# Patient Record
Sex: FEMALE | Race: WHITE | NOT HISPANIC OR LATINO | Employment: FULL TIME | URBAN - METROPOLITAN AREA
[De-identification: names, ages, dates, MRNs, and addresses within clinical notes are randomized per-mention and may not be internally consistent; named-entity substitution may affect disease eponyms.]

---

## 2017-07-03 ENCOUNTER — OFFICE VISIT (OUTPATIENT)
Dept: SURGERY | Facility: CLINIC | Age: 47
End: 2017-07-03

## 2017-07-03 VITALS — WEIGHT: 179.4 LBS | HEART RATE: 100 BPM | OXYGEN SATURATION: 97 % | HEIGHT: 66 IN | BODY MASS INDEX: 28.83 KG/M2

## 2017-07-03 DIAGNOSIS — K62.5 RECTAL BLEED: Primary | ICD-10-CM

## 2017-07-03 PROCEDURE — 99203 OFFICE O/P NEW LOW 30 MIN: CPT | Performed by: SURGERY

## 2017-07-03 RX ORDER — NAPROXEN SODIUM 220 MG
220 TABLET ORAL 2 TIMES DAILY PRN
COMMUNITY
End: 2022-01-03

## 2017-07-03 RX ORDER — SODIUM CHLORIDE 0.9 % (FLUSH) 0.9 %
1-10 SYRINGE (ML) INJECTION AS NEEDED
Status: CANCELLED | OUTPATIENT
Start: 2017-07-26

## 2017-07-07 NOTE — PROGRESS NOTES
SURGERY  Eveline Hayward   1970 07/07/17    Chief Complaint:  Rectal bleeding    HPI    Patient is a 47 y.o. 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  She describes these as being bright red, blood in the toilet, she has no pain at the anus or the abdomen to accompany this that would suggest a fissure or colitis.  She's been seen by Dr. Isaac who told her she had a skin tag and did not need surgery.    When she was pregnant with her second child, she developed a thrombosed hemorrhoid and believes she has had further of these since then, which has led to this situation.  She does some straining, and when she does then the area will get more dramatic and larger.  She's never had a colonoscopy area and she's tried MiraLAX in the past to become regular.  She has no family history of Crohn's disease, colon cancer, or colitis.    She does perceive that the symptoms are bad enough as far as the discomfort of the mass there that she would be willing to entertain the idea of surgery.    Past Medical History:   Diagnosis Date   • History of mammogram 02/13/2014     Past Surgical History:   Procedure Laterality Date   • BREAST BIOPSY Right 04/11/2011    ULTRASOUND-GUIDED RIGHT BREAST BIOPSY-BENIGN   • LASIK Bilateral      Family History   Problem Relation Age of Onset   • Hypertension Mother    • Uterine cancer Mother    • Thyroid disease Mother      Social History     Social History   • Marital status:      Spouse name: Hardeep   • Number of children: 2   • Years of education: N/A     Occupational History   • Ultrasound Technologist [employer]     Social History Main Topics   • Smoking status: Never Smoker   • Smokeless tobacco: Not on file   • Alcohol use Yes      Comment: 2x/month   • Drug use: Not on file   • Sexual activity: Defer     Other Topics Concern   • Not on file     Social History Narrative  "    Occupation/Additional Social Hx: Ultrasound technologist at [employer]      Current Outpatient Prescriptions:   •  naproxen sodium (ALEVE) 220 MG tablet, Take 220 mg by mouth 2 (Two) Times a Day As Needed for Mild Pain (1-3)., Disp: , Rfl:     No Known Allergies  Preventative Medicine  Colonoscopy: None    Review of Systems   Gastrointestinal: Positive for anal bleeding, blood in stool, constipation and diarrhea.       Vitals:    07/03/17 1525   Pulse: 100   SpO2: 97%   Weight: 179 lb 6.4 oz (81.4 kg)   Height: 66\" (167.6 cm)       PHYSICAL EXAM:    Pulse 100  Ht 66\" (167.6 cm)  Wt 179 lb 6.4 oz (81.4 kg)  SpO2 97%  BMI 28.96 kg/m2  Body mass index is 28.96 kg/(m^2).    Constitutional: well developed, well nourished, Appears stated age, well groomed  Eyes: sclera nonicteric, conjunctiva not injected or edematous  ENMT: Hearing intact, trachea midline, thyroid without masses  CVS: RRR, no murmur, peripheral edema not present  Respiratory: CTA, normal respiratory effort   Gastrointestinal: no hepatosplenomegaly, abdomen soft, nontender, nondistended, abdominal hernia is not detected, incisional scars not detected, kelly of protruding internal hemorrhoids that are small, with a combination of external as well  Genitourinary: inguinal hernia not detected  Musculoskeletal: gait normal, muscle mass normal  Skin: warm and dry, no rashes visible  Neurological: awake and alert, seems to have reasonable capacity for understanding for medical decision making  Psychiatric: appears to have reasonable judgement, pleasant  Lymphatics: no cervical adenopathy     Radiographic Findings: None    Lab Findings: None    Pamphlet reviewed: Transanal hemorrhoidal dearterialization    IMPRESSION:  · Rectal bleeding, associated with a kelly of redundant tissue externally at the anus including protrusion of small internal hemorrhoids.  There is no discrete lesion here that appears to be able to be " addressed    PLAN:  · Transanal hemorrhoidal dearterialization versus a colonoscopy with internal hemorrhoidal banding.  I discussed with her the 2 options, the greater attention to the detail of external redundant tissue via the transanal hemorrhoidal dearterialization, but with the need for general anesthetic, and certainly greater risk for postoperative pain.  With the colonoscopy, we can ascertain if there is a source for the bleeding that is worrisome, and band the internal hemorrhoids which may help to a minor extent with the protrusion of tissue, and we'll definitely result in less pain postoperatively.  She has chosen the latter and we will proceed with that.    Frida Barillas MD

## 2017-07-26 ENCOUNTER — HOSPITAL ENCOUNTER (OUTPATIENT)
Facility: HOSPITAL | Age: 47
Setting detail: HOSPITAL OUTPATIENT SURGERY
Discharge: HOME | End: 2017-07-26
Attending: SURGERY | Admitting: SURGERY

## 2017-07-26 ENCOUNTER — ANESTHESIA (OUTPATIENT)
Dept: GASTROENTEROLOGY | Facility: HOSPITAL | Age: 47
End: 2017-07-26

## 2017-07-26 ENCOUNTER — ANESTHESIA EVENT (OUTPATIENT)
Dept: GASTROENTEROLOGY | Facility: HOSPITAL | Age: 47
End: 2017-07-26

## 2017-07-26 VITALS
TEMPERATURE: 98.7 F | DIASTOLIC BLOOD PRESSURE: 45 MMHG | HEIGHT: 66 IN | OXYGEN SATURATION: 99 % | WEIGHT: 173.19 LBS | SYSTOLIC BLOOD PRESSURE: 111 MMHG | RESPIRATION RATE: 18 BRPM | BODY MASS INDEX: 27.83 KG/M2 | HEART RATE: 71 BPM

## 2017-07-26 DIAGNOSIS — K62.5 RECTAL BLEED: ICD-10-CM

## 2017-07-26 LAB
B-HCG UR QL: NEGATIVE
INTERNAL NEGATIVE CONTROL: NEGATIVE
INTERNAL POSITIVE CONTROL: POSITIVE
Lab: NORMAL

## 2017-07-26 PROCEDURE — 25010000002 PROPOFOL 10 MG/ML EMULSION: Performed by: NURSE ANESTHETIST, CERTIFIED REGISTERED

## 2017-07-26 PROCEDURE — 25010000002 GLUCAGON (RDNA) PER 1 MG: Performed by: SURGERY

## 2017-07-26 PROCEDURE — 45378 DIAGNOSTIC COLONOSCOPY: CPT | Performed by: SURGERY

## 2017-07-26 RX ORDER — SODIUM CHLORIDE 0.9 % (FLUSH) 0.9 %
1-10 SYRINGE (ML) INJECTION AS NEEDED
Status: DISCONTINUED | OUTPATIENT
Start: 2017-07-26 | End: 2017-07-26 | Stop reason: HOSPADM

## 2017-07-26 RX ORDER — PROMETHAZINE HYDROCHLORIDE 25 MG/1
25 SUPPOSITORY RECTAL ONCE AS NEEDED
Status: DISCONTINUED | OUTPATIENT
Start: 2017-07-26 | End: 2017-07-26 | Stop reason: HOSPADM

## 2017-07-26 RX ORDER — PROMETHAZINE HYDROCHLORIDE 25 MG/1
25 TABLET ORAL ONCE AS NEEDED
Status: DISCONTINUED | OUTPATIENT
Start: 2017-07-26 | End: 2017-07-26 | Stop reason: HOSPADM

## 2017-07-26 RX ORDER — PROMETHAZINE HYDROCHLORIDE 25 MG/ML
12.5 INJECTION, SOLUTION INTRAMUSCULAR; INTRAVENOUS ONCE AS NEEDED
Status: DISCONTINUED | OUTPATIENT
Start: 2017-07-26 | End: 2017-07-26 | Stop reason: HOSPADM

## 2017-07-26 RX ORDER — PROPOFOL 10 MG/ML
VIAL (ML) INTRAVENOUS AS NEEDED
Status: DISCONTINUED | OUTPATIENT
Start: 2017-07-26 | End: 2017-07-26 | Stop reason: SURG

## 2017-07-26 RX ORDER — PROPOFOL 10 MG/ML
VIAL (ML) INTRAVENOUS CONTINUOUS PRN
Status: DISCONTINUED | OUTPATIENT
Start: 2017-07-26 | End: 2017-07-26 | Stop reason: SURG

## 2017-07-26 RX ORDER — SODIUM CHLORIDE, SODIUM LACTATE, POTASSIUM CHLORIDE, CALCIUM CHLORIDE 600; 310; 30; 20 MG/100ML; MG/100ML; MG/100ML; MG/100ML
30 INJECTION, SOLUTION INTRAVENOUS CONTINUOUS PRN
Status: DISCONTINUED | OUTPATIENT
Start: 2017-07-26 | End: 2017-07-26 | Stop reason: HOSPADM

## 2017-07-26 RX ADMIN — PROPOFOL 200 MCG/KG/MIN: 10 INJECTION, EMULSION INTRAVENOUS at 07:54

## 2017-07-26 RX ADMIN — SODIUM CHLORIDE, POTASSIUM CHLORIDE, SODIUM LACTATE AND CALCIUM CHLORIDE 30 ML/HR: 600; 310; 30; 20 INJECTION, SOLUTION INTRAVENOUS at 07:03

## 2017-07-26 RX ADMIN — PROPOFOL 150 MG: 10 INJECTION, EMULSION INTRAVENOUS at 07:54

## 2017-07-26 NOTE — INTERVAL H&P NOTE
H&P updated. The patient was examined and the following changes are noted:  hemorrhoids are less problematic but still with some discomfort.  will band if appropriate

## 2017-07-26 NOTE — ANESTHESIA PREPROCEDURE EVALUATION
Anesthesia Evaluation     Patient summary reviewed and Nursing notes reviewed   NPO Solid Status: > 8 hours  NPO Liquid Status: > 4 hours     Airway   Mallampati: I  TM distance: >3 FB  Neck ROM: full  no difficulty expected  Dental - normal exam     Pulmonary - negative pulmonary ROS and normal exam   Cardiovascular - negative cardio ROS and normal exam        Neuro/Psych- negative ROS  GI/Hepatic/Renal/Endo - negative ROS     Musculoskeletal (-) negative ROS    Abdominal  - normal exam    Bowel sounds: normal.   Substance History - negative use     OB/GYN negative ob/gyn ROS         Other                                        Anesthesia Plan    ASA 1     MAC     Anesthetic plan and risks discussed with patient.

## 2017-07-26 NOTE — H&P (VIEW-ONLY)
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  She describes these as being bright red, blood in the toilet, she has no pain at the anus or the abdomen to accompany this that would suggest a fissure or colitis.  She's been seen by Dr. Isaac who told her she had a skin tag and did not need surgery.    When she was pregnant with her second child, she developed a thrombosed hemorrhoid and believes she has had further of these since then, which has led to this situation.  She does some straining, and when she does then the area will get more dramatic and larger.  She's never had a colonoscopy area and she's tried MiraLAX in the past to become regular.  She has no family history of Crohn's disease, colon cancer, or colitis.    She does perceive that the symptoms are bad enough as far as the discomfort of the mass there that she would be willing to entertain the idea of surgery.    Past Medical History:   Diagnosis Date   • History of mammogram 02/13/2014     Past Surgical History:   Procedure Laterality Date   • BREAST BIOPSY Right 04/11/2011    ULTRASOUND-GUIDED RIGHT BREAST BIOPSY-BENIGN   • LASIK Bilateral      Family History   Problem Relation Age of Onset   • Hypertension Mother    • Uterine cancer Mother    • Thyroid disease Mother      Social History     Social History   • Marital status:      Spouse name: Hardeep   • Number of children: 2   • Years of education: N/A     Occupational History   • Ultrasound Technologist [employer]     Social History Main Topics   • Smoking status: Never Smoker   • Smokeless tobacco: Not on file   • Alcohol use Yes      Comment: 2x/month   • Drug use: Not on file   • Sexual activity: Defer     Other Topics Concern   • Not on file     Social History Narrative  "    Occupation/Additional Social Hx: Ultrasound technologist at [employer]      Current Outpatient Prescriptions:   •  naproxen sodium (ALEVE) 220 MG tablet, Take 220 mg by mouth 2 (Two) Times a Day As Needed for Mild Pain (1-3)., Disp: , Rfl:     No Known Allergies  Preventative Medicine  Colonoscopy: None    Review of Systems   Gastrointestinal: Positive for anal bleeding, blood in stool, constipation and diarrhea.       Vitals:    07/03/17 1525   Pulse: 100   SpO2: 97%   Weight: 179 lb 6.4 oz (81.4 kg)   Height: 66\" (167.6 cm)       PHYSICAL EXAM:    Pulse 100  Ht 66\" (167.6 cm)  Wt 179 lb 6.4 oz (81.4 kg)  SpO2 97%  BMI 28.96 kg/m2  Body mass index is 28.96 kg/(m^2).    Constitutional: well developed, well nourished, Appears stated age, well groomed  Eyes: sclera nonicteric, conjunctiva not injected or edematous  ENMT: Hearing intact, trachea midline, thyroid without masses  CVS: RRR, no murmur, peripheral edema not present  Respiratory: CTA, normal respiratory effort   Gastrointestinal: no hepatosplenomegaly, abdomen soft, nontender, nondistended, abdominal hernia is not detected, incisional scars not detected, kelly of protruding internal hemorrhoids that are small, with a combination of external as well  Genitourinary: inguinal hernia not detected  Musculoskeletal: gait normal, muscle mass normal  Skin: warm and dry, no rashes visible  Neurological: awake and alert, seems to have reasonable capacity for understanding for medical decision making  Psychiatric: appears to have reasonable judgement, pleasant  Lymphatics: no cervical adenopathy     Radiographic Findings: None    Lab Findings: None    Pamphlet reviewed: Transanal hemorrhoidal dearterialization    IMPRESSION:  · Rectal bleeding, associated with a kelly of redundant tissue externally at the anus including protrusion of small internal hemorrhoids.  There is no discrete lesion here that appears to be able to be " addressed    PLAN:  · Transanal hemorrhoidal dearterialization versus a colonoscopy with internal hemorrhoidal banding.  I discussed with her the 2 options, the greater attention to the detail of external redundant tissue via the transanal hemorrhoidal dearterialization, but with the need for general anesthetic, and certainly greater risk for postoperative pain.  With the colonoscopy, we can ascertain if there is a source for the bleeding that is worrisome, and band the internal hemorrhoids which may help to a minor extent with the protrusion of tissue, and we'll definitely result in less pain postoperatively.  She has chosen the latter and we will proceed with that.    Frida Barillas MD

## 2017-07-26 NOTE — ANESTHESIA POSTPROCEDURE EVALUATION
"Patient: Eveline Hayward    Procedure Summary     Date Anesthesia Start Anesthesia Stop Room / Location    07/26/17 0743 0815  [facility]       Procedure Diagnosis Surgeon Provider    COLONOSCOPY into cecum/terminal ileum (N/A ) Rectal bleed  (Rectal bleed [K62.5]) MD Tamera Del Valle MD          Anesthesia Type: MAC  Last vitals  BP   111/45 (07/26/17 0833)    Temp        Pulse   71 (07/26/17 0833)   Resp   18 (07/26/17 0833)    SpO2   99 % (07/26/17 0833)      Post Anesthesia Care and Evaluation      Comments: Blood pressure 111/45, pulse 71, temperature 37.1 °C (98.7 °F), temperature source Oral, resp. rate 18, height 66\" (167.6 cm), weight 173 lb 3 oz (78.6 kg), last menstrual period 07/05/2017, SpO2 99 %.          "

## 2017-07-26 NOTE — OP NOTE
Colonoscopy Procedure Note  Eveline Hayward  1970  Date of Procedure: 07/26/17    Pre-operative Diagnosis:    · Rectal bleeding    Post-operative Diagnosis:  · Large external hemorrhoids  · Single diverticulum, descending colon    Procedure: Colonoscopy with terminal ileoscopy     Findings/Treatments:   · Large external hemorrhoids, thus not amenable to banding  · Single diverticulum, descending colon       Recommendations:   · C scope in 10 years  · Diverticulosis information to be given 7/26/17  · Call to schedule transanal hemorrhoidal dearterialization with proctopexy  · Give patient copy of procedural photographs    Surgeon: Eran    Anesthetic: MAC per Tamera Oconnor MD    Indications:  As above    Scope Withdrawal Time:  6 minutes  36 seconds    Procedure Details     MAC anesthesia was induced.  The 180 Colonoscopy was inserted blindly into the rectum and advanced to the cecum, with relative ease, but with some need for pressure, with redundancy.  Cecum was identified by ileocecal valve and appendiceal orifice and photographed for documentation.  Terminal ileum was intubated and was normal.    Prep quality was excellent.  A careful inspection was made as the colonoscope was withdrawn, including a retroflexed view of the rectum; there was no suggestion of presence of angiodysplasias, colitis, or polyps but there was a single diverticula.    I carefully visualized the hemorrhoids both from retroflexed (twice, at the beginning and end) and anteflexed, as well as pulling the tissue out thru the anus.  The hemorrhoids were sizeable and unable to be clearly identified as above the dentate line and thus not felt amenable to banding.    Frida Barillas MD  8:11 AM

## 2017-07-26 NOTE — PLAN OF CARE
Problem: Patient Care Overview (Adult)  Goal: Plan of Care Review  Outcome: Ongoing (interventions implemented as appropriate)    07/26/17 0626   Coping/Psychosocial Response Interventions   Plan Of Care Reviewed With patient   Patient Care Overview   Progress no change       Goal: Adult Individualization and Mutuality  Outcome: Ongoing (interventions implemented as appropriate)  Goal: Discharge Needs Assessment  Outcome: Ongoing (interventions implemented as appropriate)    07/26/17 0626   Discharge Needs Assessment   Concerns To Be Addressed basic needs concerns   Equipment Needed After Discharge none   Discharge Disposition home or self-care   Self-Care   Equipment Currently Used at Home none   Living Environment   Transportation Available car         Problem: GI Endoscopy (Adult)  Goal: Signs and Symptoms of Listed Potential Problems Will be Absent or Manageable (GI Endoscopy)  Outcome: Ongoing (interventions implemented as appropriate)    07/26/17 0626   GI Endoscopy   Problems Assessed (GI Endoscopy) pain   Problems Present (GI Endoscopy) none

## 2017-08-17 ENCOUNTER — OFFICE VISIT (OUTPATIENT)
Dept: INTERNAL MEDICINE | Facility: CLINIC | Age: 47
End: 2017-08-17

## 2017-08-17 VITALS
DIASTOLIC BLOOD PRESSURE: 80 MMHG | HEIGHT: 66 IN | TEMPERATURE: 98.6 F | BODY MASS INDEX: 29.25 KG/M2 | OXYGEN SATURATION: 98 % | WEIGHT: 182 LBS | RESPIRATION RATE: 16 BRPM | SYSTOLIC BLOOD PRESSURE: 120 MMHG | HEART RATE: 74 BPM

## 2017-08-17 DIAGNOSIS — S39.012A LUMBAR STRAIN, INITIAL ENCOUNTER: ICD-10-CM

## 2017-08-17 DIAGNOSIS — M54.32 SCIATICA OF LEFT SIDE: ICD-10-CM

## 2017-08-17 DIAGNOSIS — S16.1XXA NECK STRAIN, INITIAL ENCOUNTER: Primary | ICD-10-CM

## 2017-08-17 DIAGNOSIS — S46.912A SHOULDER STRAIN, LEFT, INITIAL ENCOUNTER: ICD-10-CM

## 2017-08-17 PROCEDURE — 99213 OFFICE O/P EST LOW 20 MIN: CPT | Performed by: NURSE PRACTITIONER

## 2017-08-17 RX ORDER — CYCLOBENZAPRINE HCL 10 MG
10 TABLET ORAL 3 TIMES DAILY PRN
Qty: 45 TABLET | Refills: 0 | Status: SHIPPED | OUTPATIENT
Start: 2017-08-17 | End: 2022-01-03

## 2017-08-17 NOTE — PROGRESS NOTES
Vitals:    08/17/17 1437   BP: 120/80   Pulse: 74   Resp: 16   Temp: 98.6 °F (37 °C)   SpO2: 98%     Last 2 weights    08/17/17  1437   Weight: 182 lb (82.6 kg)     Social History   Substance Use Topics   • Smoking status: Never Smoker   • Smokeless tobacco: Not on file   • Alcohol use Yes      Comment: 2x/month       Subjective     HPI  Pt presents to office today for lumbar back pain and sciatica, neck and right shoulder pain. 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 She states that she has been having left sided pain buttock pain that radiates down her left leg. This has been occurring on and off for the past 2 months. She also has been having lumbar back spasms for the past 6-8 months. She has had 3 flair up within this period. Also, pt has been experiencing right sided neck and right sided shoulder pain. She is right hand dominent and uses that side to work. She recognized this is likely work related and cant recall a specific injury that could have cause any issues she presents with today. She has tried heat with helps a lot and has been taking 2 aleve. Pt denies limited ROM, unbearable pain, numbness, tingling, loss of sensation of extremities. Her sister is a PTA and has been going stretches and massage which help some but pt is still having sciatica and lumbar problem.      The following portions of the patient's history were reviewed and updated as appropriate: allergies, current medications, past medical history, past social history and problem list.    Review of Systems   Constitutional: Negative.    Respiratory: Negative.    Cardiovascular: Negative.    Musculoskeletal: Positive for back pain.        Shoulder pain, neck pain       Objective     Physical Exam   Constitutional: She is oriented to person, place, and time. Vital signs are normal. She appears well-developed and well-nourished.   HENT:   Head: Normocephalic and atraumatic.   Neck: Normal range of motion.    Cardiovascular: Normal rate, regular rhythm and normal heart sounds.    Pulmonary/Chest: Effort normal and breath sounds normal.   Musculoskeletal: Normal range of motion.        Lumbar back: She exhibits spasm.   Right sided neck and should tenderness with ROM exercises. Able to perform ROM exercises.   Neurological: She is oriented to person, place, and time.   Nursing note and vitals reviewed.      Assessment/Plan   Eveline was seen today for back pain and neck pain.    Diagnoses and all orders for this visit:    Neck strain, initial encounter    Shoulder strain, left, initial encounter    Lumbar strain, initial encounter    Sciatica of left side    Other orders  -     cyclobenzaprine (FLEXERIL) 10 MG tablet; Take 1 tablet by mouth 3 (Three) Times a Day As Needed for Muscle Spasms.          -all issues discussed today likely related to work and repetitive movement/positions  -cont aleve  -start flexeril for muscle spasms; massage therapy; sciatica stretches, and heat therapy  -imaging not necessary at this time.  -mentioned poss referral to PT for shoulder/back/sciatica  -discussed therapy if sciatica continues-epidural injection or possible neurontin  -cont home meds  -FU in 4 weeks or soon if pain worsen

## 2018-08-13 ENCOUNTER — HOSPITAL ENCOUNTER (OUTPATIENT)
Dept: MAMMOGRAPHY | Facility: HOSPITAL | Age: 48
Discharge: HOME | End: 2018-08-13
Admitting: INTERNAL MEDICINE

## 2018-08-13 DIAGNOSIS — Z12.31 VISIT FOR SCREENING MAMMOGRAM: ICD-10-CM

## 2018-08-13 PROCEDURE — 77067 SCR MAMMO BI INCL CAD: CPT

## 2018-08-13 PROCEDURE — 77063 BREAST TOMOSYNTHESIS BI: CPT

## 2021-01-04 ENCOUNTER — IMMUNIZATION (OUTPATIENT)
Dept: VACCINE CLINIC | Facility: HOSPITAL | Age: 51
End: 2021-01-04

## 2021-01-04 PROCEDURE — 0001A: CPT | Performed by: INTERNAL MEDICINE

## 2021-01-04 PROCEDURE — 91300 HC SARSCOV02 VAC 30MCG/0.3ML IM: CPT | Performed by: INTERNAL MEDICINE

## 2021-01-26 ENCOUNTER — IMMUNIZATION (OUTPATIENT)
Dept: VACCINE CLINIC | Facility: HOSPITAL | Age: 51
End: 2021-01-26

## 2021-01-26 PROCEDURE — 91300 HC SARSCOV02 VAC 30MCG/0.3ML IM: CPT | Performed by: INTERNAL MEDICINE

## 2021-01-26 PROCEDURE — 0002A: CPT | Performed by: INTERNAL MEDICINE

## 2021-06-09 ENCOUNTER — HOSPITAL ENCOUNTER (OUTPATIENT)
Dept: MAMMOGRAPHY | Facility: HOSPITAL | Age: 51
Discharge: HOME | End: 2021-06-09
Admitting: OBSTETRICS & GYNECOLOGY

## 2021-06-09 DIAGNOSIS — Z12.31 VISIT FOR SCREENING MAMMOGRAM: ICD-10-CM

## 2021-06-09 PROCEDURE — 77067 SCR MAMMO BI INCL CAD: CPT

## 2021-06-09 PROCEDURE — 77063 BREAST TOMOSYNTHESIS BI: CPT

## 2022-01-01 ENCOUNTER — E-VISIT (OUTPATIENT)
Dept: FAMILY MEDICINE CLINIC | Facility: TELEHEALTH | Age: 52
End: 2022-01-01

## 2022-01-01 NOTE — E-VISIT ESCALATED
Patient escalated   Provider Lilibeth Dennison chose to escalate patient to another level of care because: Insufficient information to diagnose   Patient was sent the following message:   Based on the information you've provided us, you need to take another step to get care. Have additional questions for patient   What to do now:    Please set up a video visit  .   You won't be charged for your eVisit. If you paid with a credit card, the charge will be reversed.   Chief Complaint: Coronavirus (COVID-19), cold, sinus pain, allergy, or flu   Patient introduction   Patient is 51-year-old female who reports fever (which may have resolved; see below), rhinitis, sore throat, headache, sweats, and chills that started < 48 hours ago.   Patient has not requested COVID testing.   Coronavirus Disease 2019 (COVID-19) exposure, testing history, and vaccination status:    Close contact with a person with a confirmed case of COVID-19.    Exposure was more than 7 days ago.    No recent travel outside of their local community.    Patient had a viral test within the last 7 days. Test result was positive.    Reports receiving 2 doses.    Received the Pfizer vaccine for the first dose.    Received the Pfizer vaccine for the second dose.    Received their most recent dose of the vaccine > 14 days ago.   Warning. The following may warrant further investigation:    Reports confirmed exposure to COVID-19   When asked why they're seeking care online today, patient reports they want a specific treatment or medication and want to know if they need to be seen by a provider. 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   Patient did not request an excuse note.   General presentation   Symptoms came on gradually.   Sinus and nasal symptoms:    Reports rhinitis.    Reports clear nasal drainage.    Reports postnasal drip.     Denies nasal or sinus congestion.    Denies itchy nose or sneezing.   Sore throat:    Reports sore throat.    Denies recent strep exposure.    Patient does not think they have strep.    Patient is able to swallow liquids and solid foods with ease.   Head and body aches:    Reports headache, described as moderate (4-6 on a scale of 1-10).    Reports sweats.    Reports chills.    Denies myalgia.    Denies fatigue.   Dizziness:    Denies dizziness.   Cough:    Denies cough.   Wheezing and SOB:    Denies COPD diagnosis.    Denies asthma diagnosis.    Denies wheezing.    Denies shortness of breath.    Denies previous albuterol inhaler use during URIs, bronchitis, or pneumonia.    Denies previous steroid inhaler use during URIs, bronchitis, or pneumonia.   Chest pain:    Denies chest pain.   Allergies:    Denies history of allergies.   Flu exposure:    Denies recent exposure to confirmed flu diagnosis.    Reports a flu vaccine in the last 1-3 months.   Patient denies the following red flags:    Changes in alertness or awareness    Symptoms suggesting airway obstruction    Symptoms suggesting intracranial hemorrhage    Decreased urination   Pregnancy/menstrual status/breastfeeding:    Denies being pregnant    Denies breastfeeding    Regarding last menstrual period, patient writes: December 15th   Self-exam:    No difficulty moving their chin toward their chest    Neck lymph nodes feel normal   Denies antibiotic treatment for similar symptoms within the past month.   Current medications   Reports taking over-the-counter medication for current symptoms. Patient has taken ibuprofen.   Reports taking zinc (as zinc acetate), PANTHENOL/VITAMIN A/VITAMIN D/VITAMIN E, AMINO ACID/IRON/MULTIVITAMINS, estradiol / progesterone, and ibuprofen 200 MG [Anadin Ibuprofen].   Medication allergies   None.   Medication contraindication review   Denies history of anaphylactic reaction to beta-lactam antibiotics; aspirin triad; blood  dyscrasia; bone marrow depression; catecholamine-releasing paraganglioma; coronary artery disease; coagulation disorder; congenital long QT syndrome; depression; electrolyte abnormalities; fungal infection; GI bleeding; GI obstruction; G6PD deficiency; heart arrhythmia; hypertension; kidney disease or hemodialysis; mononucleosis; myasthenia; recent myocardial infarction; NSAID-induced asthma/urticaria; Parkinson's disease; pheochromocytoma; porphyria; Reye syndrome; seizure disorder; ulcerative colitis; and urinary retention.   Denies history of metoclopramide-associated dystonic reaction and tardive dyskinesia.   No known history of amoxicillin-clavulanate-associated cholestatic jaundice or hepatic impairment.   No known history of azithromycin-associated cholestatic jaundice or hepatic impairment.   Past medical history   Immune conditions: Denies immunocompromising conditions. Denies history of cancer.   Social history   Reports being a healthcare worker.   Non-smoker.   Assessment:   Patient determined to need a level of care not appropriate to be delivered through eVisit.   Plan:   Patient informed of need to seek in-person care      ----------   Electronically signed by ISSAC Green on 2022-01-01 at 00:32AM   ----------   Patient Interview Transcript:   Why are you getting care through eVisit today? We can't guarantee a specific treatment or test. Your provider will decide what's best for you. Select all that apply.    I want a specific treatment or medication    I want to know if I need to be seen by a provider   Not selected:    I want to know if I have a cold or something more serious    I need a doctor's note    I want to be tested for COVID-19    I want to get the COVID-19 vaccine    I think I'm having side effects from the COVID-19 vaccine    I just want to feel better!    None of the above   Tell us which specific treatment or medication you'd like. Your provider will make the final decision on  which treatment is best for your condition.    Covid treatment   Which of these symptoms are bothering you? Select all that apply.    Fever    Runny nose    Sore throat    Headache    Sweats    Chills   Not selected:    Cough    Shortness of breath    Stuffed-up nose or sinuses    Itchy or watery eyes    Itchy nose or sneezing    Loss of smell or taste    Hoarse voice or loss of voice    Muscle or body aches    Fatigue or tiredness    Nausea or vomiting    Diarrhea    I don't have any of these symptoms   Before we learn more about why you're here, we'll get some information related to COVID-19. We'll ask about risk factors, testing, vaccination status, and exposure. Do you have any of these conditions? If so, you may be at increased risk for complications from COVID-19. Select all that apply.    None of the above   Not selected:    Chronic lung disease, such as cystic fibrosis or interstitial fibrosis    Heart disease, such as congenital heart disease, congestive heart failure, or coronary artery disease    Disorder of the brain, spinal cord, or nerves and muscles, such as dementia, cerebral palsy, epilepsy, muscular dystrophy, or developmental delay    Metabolic disorder or mitochondrial disease    Cerebrovascular disease, such as stroke or another condition affecting the blood vessels or blood supply to the brain   Do you live in a group care setting? Examples include: - Nursing home - Residential care - Psychiatric treatment facility - Group home - Dormitory - Board and care home - Homeless shelter - Foster care setting Select one.    No   Not selected:    Yes   Have you ever been tested for COVID-19? Select one.    Yes   Not selected:    No   When was your most recent COVID-19 test? Select one.    Within the last week   Not selected:    7 to 14 days ago    15 to 30 days ago    1 to 3 months ago    More than 3 months ago   What type of COVID-19 test did you have? There are 2 types of COVID-19 tests: - Viral tests  "check if you're currently infected with COVID-19. - Antibody tests check if you've been infected in the past. Select one.    Viral test for current infection   Not selected:    Antibody test for past infection   What was the result of your most recent COVID-19 test? Select one.    Positive (signs of current or past infection)   Not selected:    Negative (no sign of infection)    I'm not sure   Have you gotten the COVID-19 vaccine? Select one.    Yes   Not selected:    No   How many doses of the COVID-19 vaccine have you gotten? This includes boosters. Select one.    2 doses   Not selected:    1 dose    3 doses   Which COVID-19 vaccine did you get for your first dose? Check your Vaccination Record Card under Product Name/. Select one.    Pfizer-BioNTech (Pfizer)   Not selected:    Shashank & Shashank's Robb Vaccine (J&J/Robb)    Moderna   Which COVID-19 vaccine did you get for your second dose? Check your Vaccination Record Card under Product Name/. Select one.    Pfizer-BioNTech (Pfizer)   Not selected:    Shashank & Shashank's Robb Vaccine (J&J/Robb)    Moderna   When did you get your most recent dose of the COVID-19 vaccine?    More than 14 days ago   Not selected:    Less than 48 hours (2 days) ago    48 to 72 hours (3 days) ago    3 to 5 days ago    5 to 7 days ago    7 to 14 days ago   In the last 14 days, have you traveled outside of your local community? This includes travel by car, RV, bus, train, or plane. Travel increases your chances of getting and spreading COVID-19. Select one.    No   Not selected:    Yes   In the last 14 days, have you had close contact with someone who has coronavirus (COVID-19)? \"Close contact\" means any of these: - Living in the same household as someone with COVID-19. - Caring for someone with COVID-19. - Being within 6 feet of someone with COVID-19 for a total of at least 15 minutes over a 24-hour period. For example, three 5-minute exposures for a " "total of 15 minutes. - Being in direct contact with respiratory droplets from someone with COVID-19 (being coughed on, kissing, sharing utensils). Select one.    Yes, a confirmed case   Not selected:    Yes, a suspected case    No, not that I know of   When did the close contact happen? Select one.    More than 7 days ago   Not selected:    Within the last 2 days    3 to 7 days ago   Thanks for completing our COVID-19 questions. Now we'll return to your symptoms. When did your symptoms start? If you know the exact date your symptoms started, choose Other and enter the month and day. Select one.    Less than 48 hours ago   Not selected:    3 to 5 days ago    6 to 9 days ago    10 to 14 days ago    2 to 4 weeks ago    More than a month ago    Other (specify)   Did your symptoms come on suddenly or gradually? Select one.    Gradually   Not selected:    Suddenly    I'm not sure   You mentioned having a fever. Do you have a fever now? Select one.    I'm not sure   Not selected:    Yes, and I've had one since my symptoms started    Yes, but I didn't have one when my symptoms started    No, it's gone now   You mentioned having a headache. On a scale of 1 to 10, how severe is your headache pain? Select one.    Moderate (4 to 6)   Not selected:    Mild (1 to 3)    Severe (7 to 9)    Unbearable (10)    The worst headache of my life (10+)   What color is your nasal drainage? Select one.    Clear   Not selected:    White    Yellow    Green    My nose is stuffed but not draining or running    I'm not sure   Is your nasal drainage thick or thin? Select one.    I'm not sure   Not selected:    Thick    Thin   Is there any drainage (mucus) going down the back of your throat? This kind of drainage is also called \"postnasal drip.\" Select one.    Yes   Not selected:    No    I'm not sure   Can you swallow liquids and solid foods? A sore throat may be painful when swallowing, but it shouldn't prevent you from swallowing. Select one.   "  Yes, with ease   Not selected:    Yes, but it's uncomfortable    Yes, but it's painful    It's hard to swallow anything because it feels like liquids and food get stuck in my throat    No, I can't swallow anything, liquid or solid foods   Since your symptoms started, have you felt dizzy? Select one.    No   Not selected:    Yes, but I can continue with my regular daily activities    Yes, and it makes it hard to stand, walk, or do daily activities   Do you have chest pain? You might also feel it as discomfort, aching, tightness, or squeezing in the chest. Select one.    No   Not selected:    Yes   Have you urinated at least 3 times in the last 24 hours? Select one.    Yes   Not selected:    No    I'm not sure   Changes in alertness or awareness may mean you need emergency care. Since your symptoms started, have you had any of these? Select all that apply.    None of the above   Not selected:    Confusion    Slurred speech    Not knowing where you are or what day it is    Difficulty staying conscious    Fainting or passing out   Do your symptoms include a whistling sound, or wheezing, when you breathe? Select one.    No   Not selected:    Yes    I'm not sure   Do you have any of these symptoms in your ear(s)? Select all that apply.    Pressure   Not selected:    Pain    Fullness    Crackling or popping    Plugged or blocked sensation    None of the above   Can you move your chin toward your chest?    Yes   Not selected:    No, my neck is too stiff   Are your tonsils larger than usual?    I'm not sure   Not selected:    Yes    No    I've had my tonsils removed   Is there any white or yellow pus on your tonsils?    I'm not sure   Not selected:    Yes    No   Are there red spots on the roof of your mouth or the back of your throat?    I'm not sure   Not selected:    Yes    No   Are your glands/lymph nodes swollen, or does it hurt when you touch them?    No   Not selected:    Yes    I'm not sure   People with a very high  body mass index (BMI) are at higher risk for developing complications from the flu and severe illness from COVID-19. To determine your BMI, we need to know your weight and height. Please enter your weight (in pounds).    Weight   Please enter your height.    Height   In the past 2 weeks, has anyone around you (such as at school, work, or home) had a confirmed diagnosis of strep throat? A confirmed diagnosis means that a throat swab and lab test were done to verify a strep throat infection. Select one.    No   Not selected:    Yes    I'm not sure   Do you think you might have strep throat? Select one.    No   Not selected:    Yes    I'm not sure   In the past week, has anyone around you (such as at school, work, or home) had a confirmed diagnosis of the flu? A confirmed diagnosis means that a nose swab was done to verify a flu infection. Select all that apply.    No   Not selected:    I live with someone who has the flu    I've been within touching distance of someone who has the flu    I've walked by, or sat about 3 feet away from, someone who has the flu    I've been in the same building as someone who has the flu    I'm not sure   Have you ever been diagnosed with asthma? Select one.    No   Not selected:    Yes   Have you ever been prescribed albuterol to use for wheezing, cough, or shortness of breath caused by a cold, bronchitis, or pneumonia? Albuterol (ProAir, Proventil, Ventolin) is prescribed as an inhaler or a solution to be used with a nebulizer machine. Select one.    No   Not selected:    Yes    I'm not sure   Have you ever been prescribed a steroid inhaler to use for wheezing, cough, or shortness of breath caused by a cold, bronchitis, or pneumonia? Some examples of steroid inhalers include Pulmicort, Flovent, Qvar, and Alvesco. Select one.    No   Not selected:    Yes    I'm not sure   Have you ever been diagnosed with chronic obstructive pulmonary disease (COPD)? Select one.    No   Not selected:     Yes    I'm not sure   Do you have allergies (pollen, dust mites, mold, animal dander)? Select one.    No   Not selected:    Yes    I'm not sure   Have you had a flu shot this season? Select one.    Yes, 1 to 3 months ago   Not selected:    Yes, less than 2 weeks ago    Yes, 2 to 4 weeks ago    Yes, 3 to 6 months ago    Yes, more than 6 months ago    I'm not sure    No   The flu and COVID-19 can be more serious for people with certain conditions or characteristics. These questions help us figure out if you or anyone you live with is at higher risk for complications from these infections. Do either of these statements apply to you? Select all that apply.    I'm a healthcare worker   Not selected:    I'm  or Native [place]    None of the above   Do you smoke tobacco? Select one.    No, never   Not selected:    Yes, every day    Yes, some days    No, I quit   Some conditions can put you at risk for more serious infections. Do any of these apply to you? Select all that apply.    None of the above   Not selected:    I've been hospitalized within the last 5 days    I have diabetes    I'm in close contact with a child in    Are you currently being treated for any of these conditions? Scroll to see all options. Select all that apply.    None of the above   Not selected:    Aspirin triad (also known as Samter's triad or ASA triad)    Asthma or hives from taking aspirin or other NSAIDs, such as ibuprofen or naproxen    Blockage or narrowing of the blood vessels of the heart    Blood dyscrasia, such anemia, leukemia, lymphoma, or myeloma    Bone marrow depression    Catecholamine-releasing paraganglioma    Blood clotting disorder    Congenital long QT syndrome    Depression    Difficulty urinating or completely emptying your bladder    Uncorrected electrolyte abnormalities    Fungal infection    Gastrointestinal (GI) bleeding    Gastrointestinal (GI) obstruction    G6PD deficiency    Recent heart attack     High blood pressure    Irregular heartbeat or heart rhythm    Kidney disease or hemodialysis    Mononucleosis (mono)    Myasthenia gravis    Parkinson's disease    Pheochromocytoma    Reye syndrome    Seizure disorder    Ulcerative colitis   Do you have any of these conditions that can affect the immune system? Scroll to see all options. Select all that apply.    None of these   Not selected:    History of bone marrow transplant    Chronic kidney disease    Chronic liver disease (including cirrhosis)    HIV/AIDS    Inflammatory bowel disease (Crohn's disease or ulcerative colitis)    Lupus    Moderate to severe plaque psoriasis    Multiple sclerosis    Rheumatoid arthritis    Sickle cell anemia    Alpha or beta thalassemia    History of solid organ transplant (kidney, liver, or heart)    History of spleen removal    An autoimmune disorder not listed here    A condition requiring treatment with long-term use of oral steroids (such as prednisone, prednisolone, or dexamethasone)   Have you ever been diagnosed with cancer? Select one.    No   Not selected:    Yes, I have cancer now    Yes, but I'm in remission   Have you ever had either of these conditions? Select all that apply.    No   Not selected:    Metoclopramide-associated dystonic reaction    Tardive dyskinesia   Do any of these apply to the people who live with you? Select all that apply.    None of the above   Not selected:    A child under the age of 5    An adult 65 or older    A person who is pregnant    A person who has given birth, had a miscarriage, had a pregnancy loss, or had an  in the last 2 weeks    An  or Native [place]   Does any member of your household have any of these medical conditions? Select all that apply.    None of the above   Not selected:    Asthma    Disorders of the brain, spinal cord, or nerves and muscles, such as dementia, cerebral palsy, epilepsy, muscular dystrophy, or developmental delay    Chronic lung  disease, such as COPD or cystic fibrosis    Heart disease, such as congenital heart disease, congestive heart failure, or coronary artery disease    Cerebrovascular disease, such as stroke or another condition affecting the blood vessels or blood supply to the brain    Blood disorders, such as sickle cell disease    Diabetes    Metabolic disorders such as inherited metabolic disorders or mitochondrial disease    Kidney disorders    Liver disorders    Weakened immune system due to illness or medications such as chemotherapy or steroids    Children under the age of 19 who are on long-term aspirin therapy    Extreme obesity (BMI > 40)   Have you gone through menopause? Select one.    No   Not selected:    Yes   Are you pregnant? Select one.    No   Not selected:    Yes   When was your last menstrual period? If you don't currently have periods or no longer have periods, please briefly explain.       Within the last 2 weeks, have you: - Given birth - Had a miscarriage - Had a pregnancy loss - Had an  Being postpartum (live birth or loss) within the last 2 weeks increases your risk of flu complications. Select one.    No   Not selected:    Yes   Are you breastfeeding? Select one.    No   Not selected:    Yes   Just a few more questions about medications, and then you're finished. Have you used any non-prescription medications or nasal sprays for your current symptoms? Examples include saline sprays, decongestants, NyQuil, and Tylenol. Select one.    Yes   Not selected:    No   Which of these non-prescription medications have you tried? Scroll to see all options. Select all that apply.    Ibuprofen (Advil, Motrin, Midol)   Not selected:    Acetaminophen (Tylenol)    Budesonide (Rhinocort)    Cetirizine (Zyrtec)    Chlorpheniramine (Aller-chlor, Chlor-Trimeton)    Cromolyn (NasalCrom)    Dextromethorphan (Delsym, Robitussin, Vicks DayQuil Cough)    Diphenhydramine (Benadryl)    Fexofenadine (Allegra)     Fluticasone (Flonase)    Guaifenesin (Mucinex)    Guaifenesin/dextromethorphan (Delsym DM, Mucinex DM, Robitussin DM)    Ketotifen (Alaway, Zaditor)    Loratadine (Alavert, Claritin)    Naphazoline-pheniramine (Naphcon-A, Opcon-A, Visine-A)    Omeprazole (Prilosec)    Oxymetazoline (Afrin)    Phenylephrine (Sudafed)    Triamcinolone (Nasacort)    None of the above   In the past month, have you taken antibiotics for similar symptoms? Examples of antibiotics include amoxicillin, amoxicillin-clavulanate (Augmentin), penicillin, cefdinir (Omnicef), doxycycline, and clindamycin (Cleocin). Select one.    No   Not selected:    Yes    I'm not sure   Have you taken any monoamine oxidase inhibitor (MAOI) medications in the last 14 days? Examples include rasagiline (Azilect), selegiline (Eldepryl, Zelapar), isocarboxazid (Marplan), phenelzine (Nardil), and tranylcypromine (Parnate). Select one.    No   Not selected:    Yes    I'm not sure   Do you take Kynmobi or Apokyn (apomorphine)? Select one.    No   Not selected:    Yes    I'm not sure   Are you taking any other medications or supplements? On the next screen, you need to list all vitamins, supplements, non-prescription medications (such as aspirin or Aleve), and prescription medications that you're taking. Select one.    Yes   Not selected:    Yes, but I'm not sure what they are    No   Have you ever had an allergic or bad reaction to any medication? Select one.    No   Not selected:    Yes   Are you allergic to milk or to the proteins found in milk (for example, whey or casein)? A milk allergy is different from lactose intolerance. Select one.    No   Not selected:    Yes    I'm not sure   Have you ever had jaundice or liver problems as a result of taking amoxicillin-clavulanate (Augmentin)? Jaundice is a condition in which the skin and the whites of the eyes turn yellow. Select all that apply.    No, not that I know of   Not selected:    Yes, jaundice    Yes, liver  problems   Have you ever had jaundice or liver problems as a result of taking azithromycin (Zithromax, Zmax)? Jaundice is a condition in which the skin and the whites of the eyes turn yellow. Select all that apply.    No, not that I know of   Not selected:    Yes, jaundice    Yes, liver problems   Do you need a doctor's note? A doctor's note confirms that you received care today and states when you can return to school or work. It does not contain information about your diagnosis or treatment plan. Your provider will make the final decision on whether to give you a doctor's note and for how long. Doctor's notes CANNOT be backdated. We can't provide medical leave paperwork through this type of visit. If more paperwork is needed to request time off, contact your primary care provider. Select one.    No   Not selected:    Today only (1 day)    Today and tomorrow (2 days)    3 days    7 days    10 days    14 days   Is there anything else you'd like to tell us about your symptoms?    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

## 2022-01-03 ENCOUNTER — TELEMEDICINE (OUTPATIENT)
Dept: FAMILY MEDICINE CLINIC | Facility: TELEHEALTH | Age: 52
End: 2022-01-03

## 2022-01-03 DIAGNOSIS — J02.9 PHARYNGITIS, UNSPECIFIED ETIOLOGY: ICD-10-CM

## 2022-01-03 DIAGNOSIS — U07.1 COVID-19 VIRUS INFECTION: Primary | ICD-10-CM

## 2022-01-03 PROCEDURE — 99213 OFFICE O/P EST LOW 20 MIN: CPT | Performed by: NURSE PRACTITIONER

## 2022-01-03 RX ORDER — PREDNISONE 10 MG/1
TABLET ORAL DAILY
Qty: 21 EACH | Refills: 0 | Status: SHIPPED | OUTPATIENT
Start: 2022-01-03 | End: 2022-01-09

## 2022-01-03 NOTE — PATIENT INSTRUCTIONS
Take medicine as prescribed.   Continue to treat symptoms.   Salt water gargles, warm liquids with honey, numbing throat spray may help.   Alternate tylenol and motrin for pain and/or fever, stay hydrated and rest.   Warning signs for COVID-19: trouble breathing, increasing shortness of breath, persistent chest pain or pressure, new confusion, inability to stay awake, pale, gray or blue-colored skin, lips or nail beds. If you show any of these signs seek Emergency Care.   If symptoms worsen or do not improve follow up with your PCP or visit your nearest Urgent Care Center or ER.        Viral Respiratory Infection  A respiratory infection is an illness that affects part of the respiratory system, such as the lungs, nose, or throat. A respiratory infection that is caused by a virus is called a viral respiratory infection.  Common types of viral respiratory infections include:  · A cold.  · The flu (influenza).  · A respiratory syncytial virus (RSV) infection.  What are the causes?  This condition is caused by a virus.  What are the signs or symptoms?  Symptoms of this condition include:  · A stuffy or runny nose.  · Yellow or green nasal discharge.  · A cough.  · Sneezing.  · Fatigue.  · Achy muscles.  · A sore throat.  · Sweating or chills.  · A fever.  · A headache.  How is this diagnosed?  This condition may be diagnosed based on:  · Your symptoms.  · A physical exam.  · Testing of nasal swabs.  How is this treated?  This condition may be treated with medicines, such as:  · Antiviral medicine. This may shorten the length of time a person has symptoms.  · Expectorants. These make it easier to cough up mucus.  · Decongestant nasal sprays.  · Acetaminophen or NSAIDs to relieve fever and pain.  Antibiotic medicines are not prescribed for viral infections. This is because antibiotics are designed to kill bacteria. They are not effective against viruses.  Follow these instructions at home:    Managing pain and  congestion  · Take over-the-counter and prescription medicines only as told by your health care provider.  · If you have a sore throat, gargle with a salt-water mixture 3-4 times a day or as needed. To make a salt-water mixture, completely dissolve ½-1 tsp of salt in 1 cup of warm water.  · Use nose drops made from salt water to ease congestion and soften raw skin around your nose.  · Drink enough fluid to keep your urine pale yellow. This helps prevent dehydration and helps loosen up mucus.  General instructions  · Rest as much as possible.  · Do not drink alcohol.  · Do not use any products that contain nicotine or tobacco, such as cigarettes and e-cigarettes. If you need help quitting, ask your health care provider.  · Keep all follow-up visits as told by your health care provider. This is important.  How is this prevented?    · Get an annual flu shot. You may get the flu shot in late summer, fall, or winter. Ask your health care provider when you should get your flu shot.  · Avoid exposing others to your respiratory infection.  ? Stay home from work or school as told by your health care provider.  ? Wash your hands with soap and water often, especially after you cough or sneeze. If soap and water are not available, use alcohol-based hand .  · Avoid contact with people who are sick during cold and flu season. This is generally fall and winter.  Contact a health care provider if:  · Your symptoms last for 10 days or longer.  · Your symptoms get worse over time.  · You have a fever.  · You have severe sinus pain in your face or forehead.  · The glands in your jaw or neck become very swollen.  Get help right away if you:  · Feel pain or pressure in your chest.  · Have shortness of breath.  · Faint or feel like you will faint.  · Have severe and persistent vomiting.  · Feel confused or disoriented.  Summary  · A respiratory infection is an illness that affects part of the respiratory system, such as the  lungs, nose, or throat. A respiratory infection that is caused by a virus is called a viral respiratory infection.  · Common types of viral respiratory infections are a cold, influenza, and respiratory syncytial virus (RSV) infection.  · Symptoms of this condition include a stuffy or runny nose, cough, sneezing, fatigue, achy muscles, sore throat, and fevers or chills.  · Antibiotic medicines are not prescribed for viral infections. This is because antibiotics are designed to kill bacteria. They are not effective against viruses.  This information is not intended to replace advice given to you by your health care provider. Make sure you discuss any questions you have with your health care provider.  Document Revised: 12/26/2019 Document Reviewed: 01/28/2019  [employer] Patient Education © 2021 [employer]    10 Things You Can Do to Manage Your COVID-19 Symptoms at Home  If you have possible or confirmed COVID-19:  1. Stay home except to get medical care.  2. Monitor your symptoms carefully. If your symptoms get worse, call your healthcare provider immediately.  3. Get rest and stay hydrated.  4. If you have a medical appointment, call the healthcare provider ahead of time and tell them that you have or may have COVID-19.  5. For medical emergencies, call 911 and notify the dispatch personnel that you have or may have COVID-19.  6. Cover your cough and sneezes with a tissue or use the inside of your elbow.  7. Wash your hands often with soap and water for at least 20 seconds or clean your hands with an alcohol-based hand  that contains at least 60% alcohol.  8. As much as possible, stay in a specific room and away from other people in your home. Also, you should use a separate bathroom, if available. If you need to be around other people in or outside of the home, wear a mask.  9. Avoid sharing personal items with other people in your household, like dishes, towels, and bedding.  10. Clean all surfaces that  are touched often, like counters, tabletops, and doorknobs. Use household cleaning sprays or wipes according to the label instructions.  cdc.gov/coronavirus  07/16/2021  This information is not intended to replace advice given to you by your health care provider. Make sure you discuss any questions you have with your health care provider.  Document Revised: 08/04/2021 Document Reviewed: 08/04/2021  [employer] Patient Education © 2021 [employer]    How to Quarantine at Home  Information for Patients and Families    These instructions are for people with confirmed or suspected COVID-19 who do not need to be hospitalized and those with confirmed COVID-19 who were hospitalized and discharged to care for themselves at home.    If you were tested through the Health Department  The Health Department will monitor your wellbeing.  If it is determined that you do not need to be hospitalized and can be isolated at home, you will be monitored by staff from your local or state health department.     If you were tested through a Commercial Lab  You will need to monitor yourself and report changes in your symptoms to your doctor.  See the section below called Monitor Your Symptoms.    Follow these steps until a healthcare provider or local or state health department says you can return to your normal activities.    Stay home except to get medical care  • Restrict activities outside your home, except for getting medical care.   • Do not go to work, school, or public areas.   • Avoid using public transportation, ride-sharing, or taxis.    Separate yourself from other people and animals in your home  People  As much as possible, you should stay in a specific room and away from other people in your home. Also, you should use a separate bathroom, if available.    Animals  You should restrict contact with pets and other animals while you are sick with COVID-19, just like you would around other people. When possible, have another member  of your household care for your animals while you are sick. If you are sick with COVID-19, avoid contact with your pet, including petting, snuggling, being kissed or licked, and sharing food. If you must care for your pet or be around animals while you are sick, wash your hands before and after you interact with pets and wear a facemask. See COVID-19 and Animals for more information.    Call ahead before visiting your doctor  If you have a medical appointment, call the healthcare provider and tell them that you have or may have COVID-19. This information will help the healthcare provider’s office take steps to keep other people from getting infected or exposed.    Wear a facemask  You should wear a facemask when you are around other people (e.g., sharing a room or vehicle) or pets and before you enter a healthcare provider’s office.     If you are not able to wear a facemask (for example, because it causes trouble breathing), then people who live with you should not stay in the same room with you, or they should wear a facemask if they enter your room.    Cover your coughs and sneezes  • Cover your mouth and nose with a tissue when you cough or sneeze.   • Throw used tissues in a lined trash can.   • Immediately wash your hands with soap and water for at least 20 seconds or, if soap and water are not available, clean your hands with an alcohol-based hand  that contains at least 60% alcohol.    Clean your hands often  • Wash your hands often with soap and water for at least 20 seconds, especially after blowing your nose, coughing, or sneezing; going to the bathroom; and before eating or preparing food.     • If soap and water are not readily available, use an alcohol-based hand  with at least 60% alcohol, covering all surfaces of your hands and rubbing them together until they feel dry.    • Soap and water are the best option if hands are visibly dirty. Avoid touching your eyes, nose, and mouth with  unwashed hands.    Avoid sharing personal household items  • You should not share dishes, drinking glasses, cups, eating utensils, towels, or bedding with other people or pets in your home.   • After using these items, they should be washed thoroughly with soap and water.    Clean all “high-touch” surfaces everyday  • High touch surfaces include counters, tabletops, doorknobs, bathroom fixtures, toilets, phones, keyboards, tablets, and bedside tables.   • Also, clean any surfaces that may have blood, stool, or body fluids on them.   • Use a household cleaning spray or wipe, according to the label instructions. Labels contain instructions for safe and effective use of the cleaning product, including precautions you should take when applying the product, such as wearing gloves and making sure you have good ventilation during use of the product.    Monitor your symptoms  • Seek prompt medical attention if your illness is worsening (e.g., difficulty breathing).   • Before seeking care, call your healthcare provider and tell them that you have, or are being evaluated for, COVID-19.   • Put on a facemask before you enter the facility.     • These steps will help the healthcare provider’s office to keep other people in the office or waiting room from getting infected or exposed.   • Persons who are placed under active monitoring or facilitated self-monitoring should follow instructions provided by their local health department or occupational health professionals, as appropriate.  • If you have a medical emergency and need to call 911, notify the dispatch personnel that you have, or are being evaluated for COVID-19. If possible, put on a facemask before emergency medical services arrive.    Discontinuing home isolation  Patients with confirmed COVID-19 should remain under home isolation precautions until the risk of secondary transmission to others is thought to be low. The decision to discontinue home isolation  precautions should be made on a case-by-case basis, in consultation with healthcare providers and state and local health departments.    The below content are for household members, intimate partners, and caregivers of a patient with symptomatic laboratory-confirmed COVID-19 or a patient under investigation:    Household members, intimate partners, and caregivers may have close contact with a person with symptomatic, laboratory-confirmed COVID-19 or a person under investigation.     Close contacts should monitor their health; they should call their healthcare provider right away if they develop symptoms suggestive of COVID-19 (e.g., fever, cough, shortness of breath)     Close contacts should also follow these recommendations:  • Make sure that you understand and can help the patient follow their healthcare provider’s instructions for medication(s) and care. You should help the patient with basic needs in the home and provide support for getting groceries, prescriptions, and other personal needs.  • Monitor the patient’s symptoms. If the patient is getting sicker, call his or her healthcare provider and tell them that the patient has laboratory-confirmed COVID-19. This will help the healthcare provider’s office take steps to keep other people in the office or waiting room from getting infected. Ask the healthcare provider to call the local or state health department for additional guidance. If the patient has a medical emergency and you need to call 911, notify the dispatch personnel that the patient has, or is being evaluated for COVID-19.  • Household members should stay in another room or be  from the patient as much as possible. Household members should use a separate bedroom and bathroom, if available.  • Prohibit visitors who do not have an essential need to be in the home.  • Household members should care for any pets in the home. Do not handle pets or other animals while sick.  For more information,  see COVID-19 and Animals.  • Make sure that shared spaces in the home have good air flow, such as by an air conditioner or an opened window, weather permitting.  • Perform hand hygiene frequently. Wash your hands often with soap and water for at least 20 seconds or use an alcohol-based hand  that contains 60 to 95% alcohol, covering all surfaces of your hands and rubbing them together until they feel dry. Soap and water should be used preferentially if hands are visibly dirty.  • Avoid touching your eyes, nose, and mouth with unwashed hands.  • The patient should wear a facemask when you are around other people. If the patient is not able to wear a facemask (for example, because it causes trouble breathing), you, as the caregiver, should wear a mask when you are in the same room as the patient.  • Wear a disposable facemask and gloves when you touch or have contact with the patient’s blood, stool, or body fluids, such as saliva, sputum, nasal mucus, vomit, or urine.   o Throw out disposable facemasks and gloves after using them. Do not reuse.  o When removing personal protective equipment, first remove and dispose of gloves. Then, immediately clean your hands with soap and water or alcohol-based hand . Next, remove and dispose of facemask, and immediately clean your hands again with soap and water or alcohol-based hand .  • Avoid sharing household items with the patient. You should not share dishes, drinking glasses, cups, eating utensils, towels, bedding, or other items. After the patient uses these items, you should wash them thoroughly (see below “Wash laundry thoroughly”).  • Clean all “high-touch” surfaces, such as counters, tabletops, doorknobs, bathroom fixtures, toilets, phones, keyboards, tablets, and bedside tables, every day. Also, clean any surfaces that may have blood, stool, or body fluids on them.   o Use a household cleaning spray or wipe, according to the label  instructions. Labels contain instructions for safe and effective use of the cleaning product including precautions you should take when applying the product, such as wearing gloves and making sure you have good ventilation during use of the product.  • Wash laundry thoroughly.   o Immediately remove and wash clothes or bedding that have blood, stool, or body fluids on them.  o Wear disposable gloves while handling soiled items and keep soiled items away from your body. Clean your hands (with soap and water or an alcohol-based hand ) immediately after removing your gloves.  o Read and follow directions on labels of laundry or clothing items and detergent. In general, using a normal laundry detergent according to washing machine instructions and dry thoroughly using the warmest temperatures recommended on the clothing label.  • Place all used disposable gloves, facemasks, and other contaminated items in a lined container before disposing of them with other household waste. Clean your hands (with soap and water or an alcohol-based hand ) immediately after handling these items. Soap and water should be used preferentially if hands are visibly dirty.  • Discuss any additional questions with your state or local health department or healthcare provider.    Adapted from information provided by the Centers for Disease Control and Prevention.  For more information, visit https://www.cdc.gov/coronavirus/2019-ncov/hcp/guidance-prevent-spread.html

## 2022-01-03 NOTE — PROGRESS NOTES
Subjective   Chief Complaint   Patient presents with   • Sore Throat   • jaimieid       Eveline Hayward is a 51 y.o. female.     Pt reports testing positive for COVID 3 days ago. She presents today with severe sore throat that began 2 days ago. Sore throat is constant, very painful and not improving. She has been fully vax against COVID.    Sore Throat   This is a new problem. Episode onset: 2 days. The problem has been gradually worsening. There has been no fever. Associated symptoms include coughing. Pertinent negatives include no abdominal pain, congestion, diarrhea, drooling, ear discharge, ear pain, headaches, hoarse voice, plugged ear sensation, neck pain, shortness of breath, stridor, swollen glands, trouble swallowing or vomiting. She has tried NSAIDs and acetaminophen for the symptoms. The treatment provided no relief.        No Known Allergies    Past Medical History:   Diagnosis Date   • History of mammogram 02/13/2014       Past Surgical History:   Procedure Laterality Date   • BREAST BIOPSY Right 04/11/2011    ULTRASOUND-GUIDED RIGHT BREAST BIOPSY-BENIGN   • COLONOSCOPY N/A 7/26/2017    Procedure: COLONOSCOPY into cecum/terminal ileum;  Surgeon: Frida Barillas MD;  [facility] ENDOSCOPY;  Service:    • LASIK Bilateral        Social History     Socioeconomic History   • Marital status:      Spouse name: Hardeep   • Number of children: 2   Tobacco Use   • Smoking status: Never Smoker   • Smokeless tobacco: Never Used   Substance and Sexual Activity   • Alcohol use: Yes     Comment: 2x/month   • Drug use: Defer   • Sexual activity: Defer       Family History   Problem Relation Age of Onset   • Hypertension Mother    • Uterine cancer Mother    • Thyroid disease Mother          Current Outpatient Medications:   •  hepatitis A (HAVRIX) 1440 EL U/ML vaccine, Inject 1 mL into the shoulder, thigh, or buttocks., Disp: 1 mL, Rfl: 0  •  hepatitis A (HAVRIX) 1440 EL U/ML vaccine, Inject  into the appropriate  muscle as directed by prescriber., Disp: 1 mL, Rfl: 0  •  predniSONE (DELTASONE) 10 MG (21) dose pack, Take  by mouth Daily for 6 days., Disp: 21 each, Rfl: 0  •  progesterone (PROMETRIUM) 200 MG capsule, Take 1 capsule by mouth every night at bedtime., Disp: 30 capsule, Rfl: 8  •  Progesterone (PROMETRIUM) 200 MG capsule, Take 1 capsule by mouth at bedtime on cycle nights 14-25, Disp: 36 capsule, Rfl: 4      Review of Systems   Constitutional: Negative for chills, diaphoresis, fatigue and fever.   HENT: Positive for sore throat. Negative for congestion, drooling, ear discharge, ear pain, hoarse voice, swollen glands and trouble swallowing.    Respiratory: Positive for cough. Negative for chest tightness, shortness of breath, wheezing and stridor.    Gastrointestinal: Negative for abdominal pain, diarrhea and vomiting.   Musculoskeletal: Negative for myalgias and neck pain.   Neurological: Negative for headache.        There were no vitals filed for this visit.    Objective   Physical Exam  Constitutional:       General: She is not in acute distress.     Appearance: Normal appearance. She is not ill-appearing, toxic-appearing or diaphoretic.   HENT:      Head: Normocephalic.      Mouth/Throat:      Lips: Pink.      Mouth: Mucous membranes are moist.      Pharynx: Pharyngeal swelling and posterior oropharyngeal erythema present.      Tonsils: No tonsillar exudate.      Comments: Per pt    Pulmonary:      Effort: Pulmonary effort is normal.   Neurological:      Mental Status: She is alert and oriented to person, place, and time.   Psychiatric:         Mood and Affect: Mood normal.         Behavior: Behavior normal.          Procedures     Assessment/Plan   Diagnoses and all orders for this visit:    1. COVID-19 virus infection (Primary)    2. Pharyngitis, unspecified etiology  -     predniSONE (DELTASONE) 10 MG (21) dose pack; Take  by mouth Daily for 6 days.  Dispense: 21 each; Refill: 0      Take medicine as  prescribed.   Continue to treat symptoms.   Salt water gargles, warm liquids with honey, numbing throat spray may help.   Alternate tylenol and motrin for pain and/or fever, stay hydrated and rest.   Warning signs for COVID-19: trouble breathing, increasing shortness of breath, persistent chest pain or pressure, new confusion, inability to stay awake, pale, gray or blue-colored skin, lips or nail beds. If you show any of these signs seek Emergency Care.   If symptoms worsen or do not improve follow up with your PCP or visit your nearest Urgent Care Center or ER.          PLAN: Discussed dosing, side effects, recommended other symptomatic care.  Patient should follow up with primary care provider if symptoms worsen, fail to resolve or other symptoms need attention. Patient/family agree to the above.         ISSAC Chan     This visit was performed via Telehealth.  This patient has been instructed to follow-up with their primary care provider if their symptoms worsen or the treatment provided does not resolve their illness.

## 2022-10-20 ENCOUNTER — HOSPITAL ENCOUNTER (OUTPATIENT)
Dept: MAMMOGRAPHY | Facility: HOSPITAL | Age: 52
Discharge: HOME | End: 2022-10-20
Admitting: OBSTETRICS & GYNECOLOGY

## 2022-10-20 DIAGNOSIS — Z12.39 SCREENING BREAST EXAMINATION: ICD-10-CM

## 2022-10-20 PROCEDURE — 77063 BREAST TOMOSYNTHESIS BI: CPT

## 2022-10-20 PROCEDURE — 77067 SCR MAMMO BI INCL CAD: CPT

## 2023-04-05 ENCOUNTER — OFFICE VISIT (OUTPATIENT)
Dept: FAMILY MEDICINE CLINIC | Facility: CLINIC | Age: 53
End: 2023-04-05
Payer: COMMERCIAL

## 2023-04-05 ENCOUNTER — PATIENT ROUNDING (BHMG ONLY) (OUTPATIENT)
Dept: FAMILY MEDICINE CLINIC | Facility: CLINIC | Age: 53
End: 2023-04-05
Payer: COMMERCIAL

## 2023-04-05 VITALS
WEIGHT: 193 LBS | SYSTOLIC BLOOD PRESSURE: 128 MMHG | HEART RATE: 65 BPM | DIASTOLIC BLOOD PRESSURE: 80 MMHG | BODY MASS INDEX: 31.02 KG/M2 | HEIGHT: 66 IN | OXYGEN SATURATION: 98 %

## 2023-04-05 DIAGNOSIS — N92.6 IRREGULAR MENSTRUAL CYCLE: ICD-10-CM

## 2023-04-05 DIAGNOSIS — M25.50 GENERALIZED JOINT PAIN: ICD-10-CM

## 2023-04-05 DIAGNOSIS — Z00.00 PREVENTATIVE HEALTH CARE: Primary | ICD-10-CM

## 2023-04-05 DIAGNOSIS — R53.82 CHRONIC FATIGUE: ICD-10-CM

## 2023-04-05 DIAGNOSIS — E55.9 VITAMIN D DEFICIENCY: ICD-10-CM

## 2023-04-05 PROCEDURE — 86803 HEPATITIS C AB TEST: CPT | Performed by: NURSE PRACTITIONER

## 2023-04-05 PROCEDURE — 83036 HEMOGLOBIN GLYCOSYLATED A1C: CPT | Performed by: NURSE PRACTITIONER

## 2023-04-05 PROCEDURE — 84439 ASSAY OF FREE THYROXINE: CPT | Performed by: NURSE PRACTITIONER

## 2023-04-05 PROCEDURE — 80050 GENERAL HEALTH PANEL: CPT | Performed by: NURSE PRACTITIONER

## 2023-04-05 PROCEDURE — 83001 ASSAY OF GONADOTROPIN (FSH): CPT | Performed by: NURSE PRACTITIONER

## 2023-04-05 PROCEDURE — 84481 FREE ASSAY (FT-3): CPT | Performed by: NURSE PRACTITIONER

## 2023-04-05 PROCEDURE — 86038 ANTINUCLEAR ANTIBODIES: CPT | Performed by: NURSE PRACTITIONER

## 2023-04-05 PROCEDURE — 83002 ASSAY OF GONADOTROPIN (LH): CPT | Performed by: NURSE PRACTITIONER

## 2023-04-05 PROCEDURE — 86431 RHEUMATOID FACTOR QUANT: CPT | Performed by: NURSE PRACTITIONER

## 2023-04-05 PROCEDURE — 82306 VITAMIN D 25 HYDROXY: CPT | Performed by: NURSE PRACTITIONER

## 2023-04-05 PROCEDURE — 80061 LIPID PANEL: CPT | Performed by: NURSE PRACTITIONER

## 2023-04-05 PROCEDURE — 84402 ASSAY OF FREE TESTOSTERONE: CPT | Performed by: NURSE PRACTITIONER

## 2023-04-05 PROCEDURE — 82607 VITAMIN B-12: CPT | Performed by: NURSE PRACTITIONER

## 2023-04-05 NOTE — ASSESSMENT & PLAN NOTE
1.  Discussed with patient that we may not consider menopausal after 12 months without menstrual cycle.  We will check labs today

## 2023-04-05 NOTE — ASSESSMENT & PLAN NOTE
1.  Labs today  2.  Consider sleep study  3.  Encourage increased water intake, well-balanced diet and exercise

## 2023-04-05 NOTE — PROGRESS NOTES
Venipuncture Blood Specimen Collection  Venipuncture performed in the right arm by Delmi Roberts MA with good hemostasis. Patient tolerated the procedure well without complications.   04/05/23   Delmi Roberts MA

## 2023-04-05 NOTE — ASSESSMENT & PLAN NOTE
1.  AMITA and rheumatoid factor today  2.  Consider imaging  3.  Most likely osteoarthritis, discussed NSAIDs but patient would like to wait until labs result

## 2023-04-05 NOTE — PROGRESS NOTES
Chief Complaint  Establish Care (Complete check up has not seen a PCP in several years), Weight Gain, Fatigue (Wondering about menopause. Has not had a cycle since January ), and Generalized Body Aches    Subjective        Eveline Hayward presents to [facility]  History of Present Illness    Patient presents to establish care.    Patient has not seen PCP in approximately 5 years. She is followed by GYN, Pap smear approximately 1.5-2 years ago. Patient previously had menorrhagia, prescribed Progesterone. She reports that improved cycles but has not had a menstrual cycle since January. Patient is c/o weight gain. She reports she has no energy so is not attempting to lose weight. Patient has struggled with plantar fascitis, which caused obstacles to exercise. Patient also c/o generalized joint pain without swelling. She reports bilateral knees, bilateral shoulders and neck cause the most pain. Patient denies dizziness, headache, chest pain, edema, cough or dyspnea.     PHQ-9 Depression Screening  Little interest or pleasure in doing things? 0-->not at all   Feeling down, depressed, or hopeless? 0-->not at all   Trouble falling or staying asleep, or sleeping too much?     Feeling tired or having little energy?     Poor appetite or overeating?     Feeling bad about yourself - or that you are a failure or have let yourself or your family down?     Trouble concentrating on things, such as reading the newspaper or watching television?     Moving or speaking so slowly that other people could have noticed? Or the opposite - being so fidgety or restless that you have been moving around a lot more than usual?     Thoughts that you would be better off dead, or of hurting yourself in some way?     PHQ-9 Total Score 0   If you checked off any problems, how difficult have these problems made it for you to do your work, take care of things at home, or get along with other people?           Objective  "  Vital Signs:  /80 (BP Location: Left arm, Patient Position: Sitting, Cuff Size: Adult)   Pulse 65   Ht 167.6 cm (66\")   Wt 87.5 kg (193 lb)   SpO2 98%   BMI 31.15 kg/m²   Estimated body mass index is 31.15 kg/m² as calculated from the following:    Height as of this encounter: 167.6 cm (66\").    Weight as of this encounter: 87.5 kg (193 lb).       BMI is >= 30 and <35. (Class 1 Obesity). The following options were offered after discussion;: exercise counseling/recommendations and nutrition counseling/recommendations      Physical Exam  Constitutional:       Appearance: Normal appearance.   HENT:      Head: Normocephalic.   Cardiovascular:      Rate and Rhythm: Normal rate and regular rhythm.   Pulmonary:      Effort: Pulmonary effort is normal.      Breath sounds: Normal breath sounds.   Abdominal:      General: Abdomen is flat. Bowel sounds are normal.      Palpations: Abdomen is soft.   Musculoskeletal:         General: Normal range of motion.      Cervical back: Neck supple.      Right lower leg: No edema.      Left lower leg: No edema.   Skin:     General: Skin is warm and dry.   Neurological:      Mental Status: She is alert and oriented to person, place, and time.      Gait: Gait is intact.   Psychiatric:         Attention and Perception: Attention normal.         Mood and Affect: Mood normal.         Speech: Speech normal.        Result Review :                   Assessment and Plan   Diagnoses and all orders for this visit:    1. Preventative health care (Primary)  Assessment & Plan:  1.  Mammogram up-to-date, due in October  2.  Colonoscopy up-to-date, due in 2027    Orders:  -     CBC (No Diff)  -     Comprehensive Metabolic Panel  -     Hemoglobin A1c  -     Hepatitis C Antibody  -     Lipid Panel    2. Irregular menstrual cycle  Assessment & Plan:  1.  Discussed with patient that we may not consider menopausal after 12 months without menstrual cycle.  We will check labs today    Orders:  -   "   Follicle stimulating hormone  -     Luteinizing hormone  -     Testosterone Free Direct    3. Chronic fatigue  Assessment & Plan:  1.  Labs today  2.  Consider sleep study  3.  Encourage increased water intake, well-balanced diet and exercise    Orders:  -     TSH  -     T4, Free  -     T3, Free  -     Vitamin B12  -     AMITA  -     Rheumatoid Factor    4. Generalized joint pain  Assessment & Plan:  1.  AMITA and rheumatoid factor today  2.  Consider imaging  3.  Most likely osteoarthritis, discussed NSAIDs but patient would like to wait until labs result    Orders:  -     AMITA  -     Rheumatoid Factor    5. Vitamin D deficiency  -     Vitamin D,25-Hydroxy         I spent 30 minutes caring for Eveline on this date of service. This time includes time spent by me in the following activities:preparing for the visit, reviewing tests, obtaining and/or reviewing a separately obtained history, performing a medically appropriate examination and/or evaluation , counseling and educating the patient/family/caregiver, ordering medications, tests, or procedures, documenting information in the medical record and care coordination  Follow Up   Return in about 8 weeks (around 5/31/2023) for Fatigue/Joint Pain.  Patient was given instructions and counseling regarding her condition or for health maintenance advice. Please see specific information pulled into the AVS if appropriate.

## 2023-04-06 LAB
25(OH)D3 SERPL-MCNC: 40.5 NG/ML (ref 30–100)
ALBUMIN SERPL-MCNC: 4.5 G/DL (ref 3.5–5.2)
ALBUMIN/GLOB SERPL: 1.3 G/DL
ALP SERPL-CCNC: 92 U/L (ref 39–117)
ALT SERPL W P-5'-P-CCNC: 33 U/L (ref 1–33)
ANION GAP SERPL CALCULATED.3IONS-SCNC: 10.2 MMOL/L (ref 5–15)
AST SERPL-CCNC: 28 U/L (ref 1–32)
BILIRUB SERPL-MCNC: 0.3 MG/DL (ref 0–1.2)
BUN SERPL-MCNC: 12 MG/DL (ref 6–20)
BUN/CREAT SERPL: 14.5 (ref 7–25)
CALCIUM SPEC-SCNC: 9.7 MG/DL (ref 8.6–10.5)
CHLORIDE SERPL-SCNC: 101 MMOL/L (ref 98–107)
CHOLEST SERPL-MCNC: 242 MG/DL (ref 0–200)
CHROMATIN AB SERPL-ACNC: <10 IU/ML (ref 0–14)
CO2 SERPL-SCNC: 29.8 MMOL/L (ref 22–29)
CREAT SERPL-MCNC: 0.83 MG/DL (ref 0.57–1)
DEPRECATED RDW RBC AUTO: 40.4 FL (ref 37–54)
EGFRCR SERPLBLD CKD-EPI 2021: 84.9 ML/MIN/1.73
ERYTHROCYTE [DISTWIDTH] IN BLOOD BY AUTOMATED COUNT: 12.1 % (ref 12.3–15.4)
FSH SERPL-ACNC: 71.8 MIU/ML
GLOBULIN UR ELPH-MCNC: 3.4 GM/DL
GLUCOSE SERPL-MCNC: 96 MG/DL (ref 65–99)
HBA1C MFR BLD: 5.5 % (ref 4.8–5.6)
HCT VFR BLD AUTO: 45.3 % (ref 34–46.6)
HCV AB SER DONR QL: NORMAL
HDLC SERPL-MCNC: 80 MG/DL (ref 40–60)
HGB BLD-MCNC: 14.3 G/DL (ref 12–15.9)
LDLC SERPL CALC-MCNC: 153 MG/DL (ref 0–100)
LDLC/HDLC SERPL: 1.89 {RATIO}
LH SERPL-ACNC: 38.4 MIU/ML
MCH RBC QN AUTO: 28.8 PG (ref 26.6–33)
MCHC RBC AUTO-ENTMCNC: 31.6 G/DL (ref 31.5–35.7)
MCV RBC AUTO: 91.3 FL (ref 79–97)
PLATELET # BLD AUTO: 277 10*3/MM3 (ref 140–450)
PMV BLD AUTO: 10.7 FL (ref 6–12)
POTASSIUM SERPL-SCNC: 4 MMOL/L (ref 3.5–5.2)
PROT SERPL-MCNC: 7.9 G/DL (ref 6–8.5)
RBC # BLD AUTO: 4.96 10*6/MM3 (ref 3.77–5.28)
SODIUM SERPL-SCNC: 141 MMOL/L (ref 136–145)
T3FREE SERPL-MCNC: 3.05 PG/ML (ref 2–4.4)
T4 FREE SERPL-MCNC: 1.16 NG/DL (ref 0.93–1.7)
TRIGL SERPL-MCNC: 55 MG/DL (ref 0–150)
TSH SERPL DL<=0.05 MIU/L-ACNC: 1.35 UIU/ML (ref 0.27–4.2)
VIT B12 BLD-MCNC: 765 PG/ML (ref 211–946)
VLDLC SERPL-MCNC: 9 MG/DL (ref 5–40)
WBC NRBC COR # BLD: 8.84 10*3/MM3 (ref 3.4–10.8)

## 2023-04-07 LAB — ANA SER QL: NEGATIVE

## 2023-04-08 LAB — TESTOST FREE SERPL-MCNC: 3.8 PG/ML (ref 0–4.2)

## 2023-04-10 DIAGNOSIS — E66.09 CLASS 1 OBESITY DUE TO EXCESS CALORIES WITH SERIOUS COMORBIDITY AND BODY MASS INDEX (BMI) OF 31.0 TO 31.9 IN ADULT: Primary | ICD-10-CM

## 2023-04-10 RX ORDER — PHENTERMINE HYDROCHLORIDE 15 MG/1
15 CAPSULE ORAL EVERY MORNING
Qty: 30 CAPSULE | Refills: 0 | Status: SHIPPED | OUTPATIENT
Start: 2023-04-10

## 2023-04-10 NOTE — PROGRESS NOTES
Phentermine 15 mg daily sent to pharmacy. We will reassess her visit in May. Call with any side effects, concerns of effectiveness or questions.

## 2023-04-10 NOTE — PROGRESS NOTES
It comes down to a preference of injectable vs. Oral medication. Both are effective. The phentermine is likely less expensive.

## 2023-05-08 DIAGNOSIS — E66.09 CLASS 1 OBESITY DUE TO EXCESS CALORIES WITH SERIOUS COMORBIDITY AND BODY MASS INDEX (BMI) OF 31.0 TO 31.9 IN ADULT: ICD-10-CM

## 2023-05-08 RX ORDER — PHENTERMINE HYDROCHLORIDE 15 MG/1
15 CAPSULE ORAL EVERY MORNING
Qty: 30 CAPSULE | Refills: 0 | Status: SHIPPED | OUTPATIENT
Start: 2023-05-08

## 2023-05-30 ENCOUNTER — OFFICE VISIT (OUTPATIENT)
Dept: FAMILY MEDICINE CLINIC | Facility: CLINIC | Age: 53
End: 2023-05-30

## 2023-05-30 VITALS
HEIGHT: 66 IN | DIASTOLIC BLOOD PRESSURE: 80 MMHG | BODY MASS INDEX: 29.57 KG/M2 | WEIGHT: 184 LBS | SYSTOLIC BLOOD PRESSURE: 128 MMHG | OXYGEN SATURATION: 97 % | HEART RATE: 66 BPM

## 2023-05-30 DIAGNOSIS — R21 FACIAL RASH: ICD-10-CM

## 2023-05-30 DIAGNOSIS — M25.50 GENERALIZED JOINT PAIN: Primary | ICD-10-CM

## 2023-05-30 DIAGNOSIS — E66.09 CLASS 1 OBESITY DUE TO EXCESS CALORIES WITH SERIOUS COMORBIDITY AND BODY MASS INDEX (BMI) OF 31.0 TO 31.9 IN ADULT: ICD-10-CM

## 2023-05-30 DIAGNOSIS — R53.82 CHRONIC FATIGUE: ICD-10-CM

## 2023-05-30 PROBLEM — E66.811 CLASS 1 OBESITY DUE TO EXCESS CALORIES WITH SERIOUS COMORBIDITY AND BODY MASS INDEX (BMI) OF 31.0 TO 31.9 IN ADULT: Status: ACTIVE | Noted: 2023-05-30

## 2023-05-30 RX ORDER — PIMECROLIMUS 10 MG/G
1 CREAM TOPICAL 2 TIMES DAILY
Qty: 60 G | Refills: 0 | Status: SHIPPED | OUTPATIENT
Start: 2023-05-30

## 2023-05-30 RX ORDER — PHENTERMINE HYDROCHLORIDE 30 MG/1
30 CAPSULE ORAL EVERY MORNING
Qty: 30 CAPSULE | Refills: 1 | Status: SHIPPED | OUTPATIENT
Start: 2023-05-30

## 2023-05-30 NOTE — PROGRESS NOTES
"Chief Complaint  Follow-up (8 week follow up fatigue/joint pain/weight loss medication ) and Rash (Breaking out on face having concerns about this. )    Subjective        Eveline Hayward presents to [facility]  History of Present Illness    Patient presents for follow-up visit.    Patient is c/o rash to her face. She reports gradually worsening over the last year.  Patient reports they do not look or feel like pimples, do not come to ahead or drain anything.  Rash does not itch or cause pain.    Patient previously complained of generalized joint pain without swelling.  She reported pain to bilateral knees, bilateral shoulders and neck.  AMITA and rheumatoid factor were negative. Pain reports pain most days is mild, tolerable.     Patient also complained of weight gain and energy loss.  She struggles with exercise due to generalized pain.  Patient also has chronic fatigue, ready for a nap at all times. Patient started on phentermine 15 mg daily -has lost 10 pounds since starting phentermine. Medication is well tolerated.  She denies any change in energy level with weight loss.  Patient is not exercising.  She is getting approximately 6 hours of sleep due to schedule.    Objective   Vital Signs:  /80 (BP Location: Left arm, Patient Position: Sitting, Cuff Size: Adult)   Pulse 66   Ht 167.6 cm (66\")   Wt 83.5 kg (184 lb)   SpO2 97%   BMI 29.70 kg/m²   Estimated body mass index is 29.7 kg/m² as calculated from the following:    Height as of this encounter: 167.6 cm (66\").    Weight as of this encounter: 83.5 kg (184 lb).             Physical Exam  Constitutional:       Appearance: Normal appearance.   HENT:      Head: Normocephalic.   Cardiovascular:      Rate and Rhythm: Normal rate and regular rhythm.   Pulmonary:      Effort: Pulmonary effort is normal.      Breath sounds: Normal breath sounds.   Abdominal:      General: Abdomen is flat. Bowel sounds are normal.      " Palpations: Abdomen is soft.   Musculoskeletal:         General: Normal range of motion.      Cervical back: Neck supple.      Right lower leg: No edema.      Left lower leg: No edema.   Skin:     General: Skin is warm and dry.   Neurological:      Mental Status: She is alert and oriented to person, place, and time.      Gait: Gait is intact.   Psychiatric:         Attention and Perception: Attention normal.         Mood and Affect: Mood normal.         Speech: Speech normal.        Result Review :    CMP        4/5/2023    15:30   CMP   Glucose 96     BUN 12     Creatinine 0.83     EGFR 84.9     Sodium 141     Potassium 4.0     Chloride 101     Calcium 9.7     Total Protein 7.9     Albumin 4.5     Globulin 3.4     Total Bilirubin 0.3     Alkaline Phosphatase 92     AST (SGOT) 28     ALT (SGPT) 33     Albumin/Globulin Ratio 1.3     BUN/Creatinine Ratio 14.5     Anion Gap 10.2       CBC        4/5/2023    15:30   CBC   WBC 8.84     RBC 4.96     Hemoglobin 14.3     Hematocrit 45.3     MCV 91.3     MCH 28.8     MCHC 31.6     RDW 12.1     Platelets 277       Lipid Panel        4/5/2023    15:30   Lipid Panel   Total Cholesterol 242     Triglycerides 55     HDL Cholesterol 80     VLDL Cholesterol 9     LDL Cholesterol  153     LDL/HDL Ratio 1.89       TSH        4/5/2023    15:30   TSH   TSH 1.350       Most Recent A1C        4/5/2023    15:30   HGBA1C Most Recent   Hemoglobin A1C 5.50                    Assessment and Plan   Diagnoses and all orders for this visit:    1. Generalized joint pain (Primary)  Assessment & Plan:  1.  Patient reports symptoms mild at this time, consider x-rays      2. Chronic fatigue  Assessment & Plan:  1.  Labs reviewed  2.  Encouraged improvement in sleep routine, work on going to bed 1 hour earlier  3.  Exercise encouraged  4.  Consider sleep study      3. Class 1 obesity due to excess calories with serious comorbidity and body mass index (BMI) of 31.0 to 31.9 in adult  Assessment &  Plan:  Patient's (Body mass index is 29.7 kg/m².) indicates that they are obese (BMI >30) with health conditions that include none . Weight is improving with treatment. BMI  is above average; BMI management plan is completed. We discussed portion control, increasing exercise, joining a fitness center or start home based exercise program, management of depression/anxiety/stress to control compensatory eating, decreasing alcohol consumption and pharmacologic options including Increase phentermine to 30 mg daily.     Orders:  -     phentermine 30 MG capsule; Take 1 capsule by mouth Every Morning.  Dispense: 30 capsule; Refill: 1    4. Facial rash  Assessment & Plan:  1.  Consistent with rosacea -start Elidel cream, apply twice daily as needed      Other orders  -     pimecrolimus (Elidel) 1 % cream; Apply 1 application topically to the appropriate area as directed 2 (Two) Times a Day. Indications: Rosacea  Dispense: 60 g; Refill: 0         I spent 30 minutes caring for Eveline on this date of service. This time includes time spent by me in the following activities:preparing for the visit, reviewing tests, obtaining and/or reviewing a separately obtained history, performing a medically appropriate examination and/or evaluation , counseling and educating the patient/family/caregiver, ordering medications, tests, or procedures, documenting information in the medical record and care coordination  Follow Up   Return in about 8 weeks (around 7/25/2023) for Obesity.  Patient was given instructions and counseling regarding her condition or for health maintenance advice. Please see specific information pulled into the AVS if appropriate.       Answers for HPI/ROS submitted by the patient on 5/30/2023  Please describe your symptoms.: Follow up  Have you had these symptoms before?: No  How long have you been having these symptoms?: Greater than 2 weeks  What is the primary reason for your visit?: Other

## 2023-05-31 PROBLEM — R21 FACIAL RASH: Status: ACTIVE | Noted: 2023-05-31

## 2023-05-31 NOTE — ASSESSMENT & PLAN NOTE
Patient's (Body mass index is 29.7 kg/m².) indicates that they are obese (BMI >30) with health conditions that include none . Weight is improving with treatment. BMI  is above average; BMI management plan is completed. We discussed portion control, increasing exercise, joining a fitness center or start home based exercise program, management of depression/anxiety/stress to control compensatory eating, decreasing alcohol consumption and pharmacologic options including Increase phentermine to 30 mg daily.

## 2023-05-31 NOTE — ASSESSMENT & PLAN NOTE
1.  Labs reviewed  2.  Encouraged improvement in sleep routine, work on going to bed 1 hour earlier  3.  Exercise encouraged  4.  Consider sleep study

## 2023-06-06 ENCOUNTER — TELEPHONE (OUTPATIENT)
Dept: FAMILY MEDICINE CLINIC | Facility: CLINIC | Age: 53
End: 2023-06-06
Payer: COMMERCIAL

## 2023-06-06 RX ORDER — TACROLIMUS 0.3 MG/G
1 OINTMENT TOPICAL 2 TIMES DAILY
Qty: 60 G | Refills: 0 | Status: SHIPPED | OUTPATIENT
Start: 2023-06-06

## 2023-06-06 NOTE — TELEPHONE ENCOUNTER
Pt's insurance denied the PA on pimercrolimus 1% cream. They said the patient would first have to try and fail on tacrolimus 0.03% ointment,  tacrolimus 0.1% ointment. They said both would have to be tried before coverage would be acceptable for the pimercrolimus. Please advise.

## 2023-08-02 ENCOUNTER — OFFICE VISIT (OUTPATIENT)
Dept: FAMILY MEDICINE CLINIC | Facility: CLINIC | Age: 53
End: 2023-08-02
Payer: COMMERCIAL

## 2023-08-02 VITALS
DIASTOLIC BLOOD PRESSURE: 70 MMHG | WEIGHT: 179 LBS | HEART RATE: 73 BPM | SYSTOLIC BLOOD PRESSURE: 122 MMHG | HEIGHT: 66 IN | OXYGEN SATURATION: 99 % | BODY MASS INDEX: 28.77 KG/M2

## 2023-08-02 DIAGNOSIS — R21 FACIAL RASH: Primary | ICD-10-CM

## 2023-08-02 DIAGNOSIS — E66.09 CLASS 1 OBESITY DUE TO EXCESS CALORIES WITH SERIOUS COMORBIDITY AND BODY MASS INDEX (BMI) OF 31.0 TO 31.9 IN ADULT: ICD-10-CM

## 2023-08-02 DIAGNOSIS — R53.82 CHRONIC FATIGUE: ICD-10-CM

## 2023-08-02 PROCEDURE — 99214 OFFICE O/P EST MOD 30 MIN: CPT | Performed by: NURSE PRACTITIONER

## 2023-08-02 RX ORDER — SEMAGLUTIDE 0.25 MG/.5ML
0.25 INJECTION, SOLUTION SUBCUTANEOUS WEEKLY
Qty: 2 ML | Refills: 0 | Status: SHIPPED | OUTPATIENT
Start: 2023-08-02

## 2023-08-02 RX ORDER — TACROLIMUS 1 MG/G
1 OINTMENT TOPICAL 2 TIMES DAILY
Qty: 30 G | Refills: 0 | Status: SHIPPED | OUTPATIENT
Start: 2023-08-02

## 2023-08-11 DIAGNOSIS — E66.09 CLASS 1 OBESITY DUE TO EXCESS CALORIES WITH SERIOUS COMORBIDITY AND BODY MASS INDEX (BMI) OF 31.0 TO 31.9 IN ADULT: ICD-10-CM

## 2023-08-11 RX ORDER — PHENTERMINE HYDROCHLORIDE 30 MG/1
30 CAPSULE ORAL EVERY MORNING
Qty: 30 CAPSULE | Refills: 1 | Status: SHIPPED | OUTPATIENT
Start: 2023-08-11

## 2023-12-13 ENCOUNTER — OFFICE VISIT (OUTPATIENT)
Dept: FAMILY MEDICINE CLINIC | Facility: CLINIC | Age: 53
End: 2023-12-13
Payer: COMMERCIAL

## 2023-12-13 VITALS
HEIGHT: 66 IN | WEIGHT: 173 LBS | BODY MASS INDEX: 27.8 KG/M2 | OXYGEN SATURATION: 99 % | SYSTOLIC BLOOD PRESSURE: 130 MMHG | HEART RATE: 68 BPM | DIASTOLIC BLOOD PRESSURE: 74 MMHG

## 2023-12-13 DIAGNOSIS — M25.511 CHRONIC PAIN OF BOTH SHOULDERS: Primary | ICD-10-CM

## 2023-12-13 DIAGNOSIS — Z12.31 SCREENING MAMMOGRAM FOR BREAST CANCER: ICD-10-CM

## 2023-12-13 DIAGNOSIS — E66.3 OVERWEIGHT (BMI 25.0-29.9): ICD-10-CM

## 2023-12-13 DIAGNOSIS — G89.29 CHRONIC PAIN OF BOTH SHOULDERS: Primary | ICD-10-CM

## 2023-12-13 DIAGNOSIS — M25.512 CHRONIC PAIN OF BOTH SHOULDERS: Primary | ICD-10-CM

## 2023-12-13 NOTE — PROGRESS NOTES
"Chief Complaint  Follow-up (6 week follow up obesity )    Subjective        Eveline Hayward presents to [facility]  History of Present Illness    Patient previously complained of weight gain and energy loss.  She struggles with exercise due to generalized joint pain. AMITA and RF were normal.  Patient also has chronic fatigue, daytime somnolence. Patient lost 15 pounds with Phentermine 30 mg daily.   Patient is not exercising.  She is getting approximately 6 hours of sleep due to schedule.  Wegovy 0.25 mg weekly started at visit in August but insurance denied and patient did not proceed with injections.     Patient is complaining of pain to bilateral shoulders.  She denies injury.  Patient describes as persistent aching tightness, worse with certain movements.      Objective   Vital Signs:  /74 (BP Location: Left arm, Patient Position: Sitting, Cuff Size: Adult)   Pulse 68   Ht 167.6 cm (66\")   Wt 78.5 kg (173 lb)   SpO2 99%   BMI 27.92 kg/m²   Estimated body mass index is 27.92 kg/m² as calculated from the following:    Height as of this encounter: 167.6 cm (66\").    Weight as of this encounter: 78.5 kg (173 lb).           Physical Exam  Constitutional:       Appearance: Normal appearance.   HENT:      Head: Normocephalic.   Cardiovascular:      Rate and Rhythm: Normal rate and regular rhythm.   Pulmonary:      Effort: Pulmonary effort is normal.      Breath sounds: Normal breath sounds.   Abdominal:      General: Abdomen is flat. Bowel sounds are normal.      Palpations: Abdomen is soft.   Musculoskeletal:         General: Normal range of motion.      Right shoulder: Normal range of motion.      Left shoulder: Normal range of motion.      Cervical back: Neck supple.      Right lower leg: No edema.      Left lower leg: No edema.   Skin:     General: Skin is warm and dry.   Neurological:      Mental Status: She is alert and oriented to person, place, and time.      Gait: " Gait is intact.   Psychiatric:         Attention and Perception: Attention normal.         Mood and Affect: Mood normal.         Speech: Speech normal.        Result Review :    CMP          4/5/2023    15:30   CMP   Glucose 96    BUN 12    Creatinine 0.83    EGFR 84.9    Sodium 141    Potassium 4.0    Chloride 101    Calcium 9.7    Total Protein 7.9    Albumin 4.5    Globulin 3.4    Total Bilirubin 0.3    Alkaline Phosphatase 92    AST (SGOT) 28    ALT (SGPT) 33    Albumin/Globulin Ratio 1.3    BUN/Creatinine Ratio 14.5    Anion Gap 10.2      CBC          4/5/2023    15:30   CBC   WBC 8.84    RBC 4.96    Hemoglobin 14.3    Hematocrit 45.3    MCV 91.3    MCH 28.8    MCHC 31.6    RDW 12.1    Platelets 277      Lipid Panel          4/5/2023    15:30   Lipid Panel   Total Cholesterol 242    Triglycerides 55    HDL Cholesterol 80    VLDL Cholesterol 9    LDL Cholesterol  153    LDL/HDL Ratio 1.89      TSH          4/5/2023    15:30   TSH   TSH 1.350      Most Recent A1C          4/5/2023    15:30   HGBA1C Most Recent   Hemoglobin A1C 5.50                   Assessment and Plan   Diagnoses and all orders for this visit:    1. Chronic pain of both shoulders (Primary)  -     XR Shoulder 2+ View Bilateral (In Office); Future    2. Screening mammogram for breast cancer  -     Mammo Screening Digital Tomosynthesis Bilateral With CAD; Future    3. Overweight (BMI 25.0-29.9)  Assessment & Plan:  Patient's (Body mass index is 27.92 kg/m².) indicates that they are overweight with health conditions that include none . Weight is improving with treatment. BMI is is above average; BMI management plan is completed. We discussed portion control, increasing exercise, management of depression/anxiety/stress to control compensatory eating, and decreasing alcohol consumption. Hold wegovy. Patient wants to continue to work on lifestyle modification. Reassess in 6 months.              I spent 30 minutes caring for Eveline on this date of  service. This time includes time spent by me in the following activities:preparing for the visit, reviewing tests, obtaining and/or reviewing a separately obtained history, performing a medically appropriate examination and/or evaluation , counseling and educating the patient/family/caregiver, ordering medications, tests, or procedures, documenting information in the medical record, and care coordination  Follow Up   Return in about 5 months (around 5/13/2024) for Annual physical.  Patient was given instructions and counseling regarding her condition or for health maintenance advice. Please see specific information pulled into the AVS if appropriate.

## 2023-12-14 PROBLEM — M25.511 CHRONIC PAIN OF BOTH SHOULDERS: Status: ACTIVE | Noted: 2023-12-14

## 2023-12-14 PROBLEM — E66.3 OVERWEIGHT (BMI 25.0-29.9): Status: ACTIVE | Noted: 2023-12-14

## 2023-12-14 PROBLEM — Z12.31 SCREENING MAMMOGRAM FOR BREAST CANCER: Status: ACTIVE | Noted: 2023-12-14

## 2023-12-14 PROBLEM — M25.512 CHRONIC PAIN OF BOTH SHOULDERS: Status: ACTIVE | Noted: 2023-12-14

## 2023-12-14 PROBLEM — G89.29 CHRONIC PAIN OF BOTH SHOULDERS: Status: ACTIVE | Noted: 2023-12-14

## 2023-12-14 NOTE — ASSESSMENT & PLAN NOTE
Patient's (Body mass index is 27.92 kg/m².) indicates that they are overweight with health conditions that include none . Weight is improving with treatment. BMI is is above average; BMI management plan is completed. We discussed portion control, increasing exercise, management of depression/anxiety/stress to control compensatory eating, and decreasing alcohol consumption. Hold wegovy. Patient wants to continue to work on lifestyle modification. Reassess in 6 months.

## 2024-02-15 ENCOUNTER — HOSPITAL ENCOUNTER (OUTPATIENT)
Dept: GENERAL RADIOLOGY | Facility: HOSPITAL | Age: 54
Discharge: HOME | End: 2024-02-15
Admitting: NURSE PRACTITIONER
Payer: COMMERCIAL

## 2024-02-15 DIAGNOSIS — M25.511 CHRONIC PAIN OF BOTH SHOULDERS: ICD-10-CM

## 2024-02-15 DIAGNOSIS — G89.29 CHRONIC PAIN OF BOTH SHOULDERS: ICD-10-CM

## 2024-02-15 DIAGNOSIS — M25.512 CHRONIC PAIN OF BOTH SHOULDERS: ICD-10-CM

## 2024-02-15 PROCEDURE — 73030 X-RAY EXAM OF SHOULDER: CPT

## 2024-04-24 ENCOUNTER — HOSPITAL ENCOUNTER (OUTPATIENT)
Dept: MAMMOGRAPHY | Facility: HOSPITAL | Age: 54
Discharge: HOME | End: 2024-04-24
Admitting: NURSE PRACTITIONER
Payer: COMMERCIAL

## 2024-04-24 DIAGNOSIS — Z12.31 SCREENING MAMMOGRAM FOR BREAST CANCER: ICD-10-CM

## 2024-04-24 PROCEDURE — 77063 BREAST TOMOSYNTHESIS BI: CPT

## 2024-04-24 PROCEDURE — 77067 SCR MAMMO BI INCL CAD: CPT

## 2024-05-15 ENCOUNTER — OFFICE VISIT (OUTPATIENT)
Dept: FAMILY MEDICINE CLINIC | Facility: CLINIC | Age: 54
End: 2024-05-15
Payer: COMMERCIAL

## 2024-05-15 VITALS
SYSTOLIC BLOOD PRESSURE: 124 MMHG | HEART RATE: 71 BPM | WEIGHT: 181 LBS | OXYGEN SATURATION: 96 % | DIASTOLIC BLOOD PRESSURE: 70 MMHG | BODY MASS INDEX: 29.09 KG/M2 | HEIGHT: 66 IN

## 2024-05-15 DIAGNOSIS — J00 HEAD COLD: ICD-10-CM

## 2024-05-15 DIAGNOSIS — E55.9 VITAMIN D DEFICIENCY: ICD-10-CM

## 2024-05-15 DIAGNOSIS — E66.3 OVERWEIGHT (BMI 25.0-29.9): Primary | ICD-10-CM

## 2024-05-15 DIAGNOSIS — Z00.00 ANNUAL PHYSICAL EXAM: ICD-10-CM

## 2024-05-15 PROCEDURE — 99396 PREV VISIT EST AGE 40-64: CPT | Performed by: NURSE PRACTITIONER

## 2024-05-15 RX ORDER — CLINDAMYCIN PHOSPHATE 10 UG/ML
LOTION TOPICAL 2 TIMES DAILY
COMMUNITY
Start: 2024-05-09

## 2024-05-15 NOTE — ASSESSMENT & PLAN NOTE
Patient's (Body mass index is 29.21 kg/m².) indicates that they are overweight with health conditions that include none . Weight is worsening. BMI is is above average; BMI management plan is completed. We discussed portion control, increasing exercise, joining a fitness center or start home based exercise program, management of depression/anxiety/stress to control compensatory eating, decreasing alcohol consumption, and pharmacologic options including Phentermine x 3 months .

## 2024-05-15 NOTE — PROGRESS NOTES
"Chief Complaint  Annual Exam and Cough (For the last 2 weeks )    Subjective        Eveline Hayward presents to [facility]  History of Present Illness    Patient presents for Annual Exam without Pap smear.  Medical history includes obesity, hemorrhoids and chronic fatigue.  Patient is not taking any medications routinely.  She had success with Phentermine for obesity treatment, wanting to restart medication. Patient eating well balanced diet, could improve with exercising - limited due to fatigue and work schedule.  Patient denies dizziness, headache, chest pain, swelling, cough or dyspnea.  She has no GI or  complaints.    Patient is c/o productive cough, nasal congestion, ear fullness. She denies facial pain, fever, chest pain or dyspnea/wheezing.  Sx ongoing x 2 weeks - improving.     PHQ-9 Depression Screening  Little interest or pleasure in doing things? 0-->not at all   Feeling down, depressed, or hopeless? 0-->not at all   Trouble falling or staying asleep, or sleeping too much?     Feeling tired or having little energy?     Poor appetite or overeating?     Feeling bad about yourself - or that you are a failure or have let yourself or your family down?     Trouble concentrating on things, such as reading the newspaper or watching television?     Moving or speaking so slowly that other people could have noticed? Or the opposite - being so fidgety or restless that you have been moving around a lot more than usual?     Thoughts that you would be better off dead, or of hurting yourself in some way?     PHQ-9 Total Score 0   If you checked off any problems, how difficult have these problems made it for you to do your work, take care of things at home, or get along with other people?        Objective   Vital Signs:  /70 (BP Location: Left arm, Patient Position: Sitting, Cuff Size: Adult)   Pulse 71   Ht 167.6 cm (66\")   Wt 82.1 kg (181 lb)   SpO2 96%   BMI 29.21 kg/m² " "  Estimated body mass index is 29.21 kg/m² as calculated from the following:    Height as of this encounter: 167.6 cm (66\").    Weight as of this encounter: 82.1 kg (181 lb).             Physical Exam  Constitutional:       Appearance: Normal appearance.   HENT:      Head: Normocephalic.      Right Ear: Tympanic membrane normal.      Left Ear: A middle ear effusion is present.      Mouth/Throat:      Pharynx: Oropharynx is clear.   Cardiovascular:      Rate and Rhythm: Normal rate and regular rhythm.   Pulmonary:      Effort: Pulmonary effort is normal.      Breath sounds: Normal breath sounds.   Abdominal:      General: Abdomen is flat. Bowel sounds are normal.      Palpations: Abdomen is soft.   Musculoskeletal:         General: Normal range of motion.      Cervical back: Neck supple.      Right lower leg: No edema.      Left lower leg: No edema.   Skin:     General: Skin is warm and dry.   Neurological:      Mental Status: She is alert and oriented to person, place, and time.      Gait: Gait is intact.   Psychiatric:         Attention and Perception: Attention normal.         Mood and Affect: Mood normal.         Speech: Speech normal.        Result Review :                     Assessment and Plan     Diagnoses and all orders for this visit:    1. Overweight (BMI 25.0-29.9) (Primary)  Assessment & Plan:  Patient's (Body mass index is 29.21 kg/m².) indicates that they are overweight with health conditions that include none . Weight is worsening. BMI is is above average; BMI management plan is completed. We discussed portion control, increasing exercise, joining a fitness center or start home based exercise program, management of depression/anxiety/stress to control compensatory eating, decreasing alcohol consumption, and pharmacologic options including Phentermine x 3 months .       2. Annual physical exam  Assessment & Plan:  Labs ordered  Mammogram up-to-date  Colon cancer screening up-to-date  Routine vision " and dental screenings advised    Orders:  -     CBC (No Diff)  -     Comprehensive Metabolic Panel  -     Hemoglobin A1c  -     Lipid Panel  -     TSH    3. Vitamin D deficiency  -     Vitamin D,25-Hydroxy    4. Head cold  Assessment & Plan:  Improving  Start Flonase 2 sprays each nostril daily  Call if sx persist or worsen             I spent 30 minutes caring for Eveline on this date of service. This time includes time spent by me in the following activities:preparing for the visit, reviewing tests, obtaining and/or reviewing a separately obtained history, performing a medically appropriate examination and/or evaluation , counseling and educating the patient/family/caregiver, ordering medications, tests, or procedures, documenting information in the medical record, and care coordination  Follow Up     Return in about 3 months (around 8/15/2024) for Overweight.  Patient was given instructions and counseling regarding her condition or for health maintenance advice. Please see specific information pulled into the AVS if appropriate.     Counseling was given to patient for the following topics: diagnostic results, instructions for management, risk factor reductions, prognosis, patient and family education, impressions, risks and benefits of treatment options, and importance of treatment compliance . Total time of the encounter was 25 minutes and 5 minutes was spent counseling.

## 2024-05-15 NOTE — ASSESSMENT & PLAN NOTE
Labs ordered  Mammogram up-to-date  Colon cancer screening up-to-date  Routine vision and dental screenings advised

## 2024-05-19 DIAGNOSIS — E66.09 CLASS 1 OBESITY DUE TO EXCESS CALORIES WITH SERIOUS COMORBIDITY AND BODY MASS INDEX (BMI) OF 31.0 TO 31.9 IN ADULT: ICD-10-CM

## 2024-05-19 RX ORDER — PHENTERMINE HYDROCHLORIDE 30 MG/1
30 CAPSULE ORAL EVERY MORNING
Qty: 30 CAPSULE | Refills: 1 | Status: SHIPPED | OUTPATIENT
Start: 2024-05-19

## 2024-06-17 ENCOUNTER — TELEPHONE (OUTPATIENT)
Dept: FAMILY MEDICINE CLINIC | Facility: CLINIC | Age: 54
End: 2024-06-17
Payer: COMMERCIAL

## 2024-06-17 NOTE — TELEPHONE ENCOUNTER
Attempted to call pt r/t overdue labs, no answer: left msg to see if pt plans on completing or wanting to schedule appt to complete

## 2024-06-21 ENCOUNTER — LAB (OUTPATIENT)
Dept: LAB | Facility: HOSPITAL | Age: 54
End: 2024-06-21
Payer: COMMERCIAL

## 2024-06-21 LAB
25(OH)D3 SERPL-MCNC: 43.6 NG/ML (ref 30–100)
ALBUMIN SERPL-MCNC: 4.3 G/DL (ref 3.5–5.2)
ALBUMIN/GLOB SERPL: 1.3 G/DL
ALP SERPL-CCNC: 90 U/L (ref 39–117)
ALT SERPL W P-5'-P-CCNC: 23 U/L (ref 1–33)
ANION GAP SERPL CALCULATED.3IONS-SCNC: 8 MMOL/L (ref 5–15)
AST SERPL-CCNC: 21 U/L (ref 1–32)
BILIRUB SERPL-MCNC: 0.4 MG/DL (ref 0–1.2)
BUN SERPL-MCNC: 14 MG/DL (ref 6–20)
BUN/CREAT SERPL: 15.9 (ref 7–25)
CALCIUM SPEC-SCNC: 9.4 MG/DL (ref 8.6–10.5)
CHLORIDE SERPL-SCNC: 103 MMOL/L (ref 98–107)
CHOLEST SERPL-MCNC: 253 MG/DL (ref 0–200)
CO2 SERPL-SCNC: 29 MMOL/L (ref 22–29)
CREAT SERPL-MCNC: 0.88 MG/DL (ref 0.57–1)
DEPRECATED RDW RBC AUTO: 41.6 FL (ref 37–54)
EGFRCR SERPLBLD CKD-EPI 2021: 78.2 ML/MIN/1.73
ERYTHROCYTE [DISTWIDTH] IN BLOOD BY AUTOMATED COUNT: 12.7 % (ref 12.3–15.4)
GLOBULIN UR ELPH-MCNC: 3.2 GM/DL
GLUCOSE SERPL-MCNC: 95 MG/DL (ref 65–99)
HBA1C MFR BLD: 5.4 % (ref 4.8–5.6)
HCT VFR BLD AUTO: 44.9 % (ref 34–46.6)
HDLC SERPL-MCNC: 85 MG/DL (ref 40–60)
HGB BLD-MCNC: 14.9 G/DL (ref 12–15.9)
LDLC SERPL CALC-MCNC: 160 MG/DL (ref 0–100)
LDLC/HDLC SERPL: 1.86 {RATIO}
MCH RBC QN AUTO: 30.2 PG (ref 26.6–33)
MCHC RBC AUTO-ENTMCNC: 33.2 G/DL (ref 31.5–35.7)
MCV RBC AUTO: 90.9 FL (ref 79–97)
PLATELET # BLD AUTO: 252 10*3/MM3 (ref 140–450)
PMV BLD AUTO: 9.3 FL (ref 6–12)
POTASSIUM SERPL-SCNC: 4.1 MMOL/L (ref 3.5–5.2)
PROT SERPL-MCNC: 7.5 G/DL (ref 6–8.5)
RBC # BLD AUTO: 4.94 10*6/MM3 (ref 3.77–5.28)
SODIUM SERPL-SCNC: 140 MMOL/L (ref 136–145)
TRIGL SERPL-MCNC: 51 MG/DL (ref 0–150)
TSH SERPL DL<=0.05 MIU/L-ACNC: 2.71 UIU/ML (ref 0.27–4.2)
VLDLC SERPL-MCNC: 8 MG/DL (ref 5–40)
WBC NRBC COR # BLD AUTO: 5.48 10*3/MM3 (ref 3.4–10.8)

## 2024-06-21 PROCEDURE — 80061 LIPID PANEL: CPT | Performed by: NURSE PRACTITIONER

## 2024-06-21 PROCEDURE — 80050 GENERAL HEALTH PANEL: CPT | Performed by: NURSE PRACTITIONER

## 2024-06-21 PROCEDURE — 83036 HEMOGLOBIN GLYCOSYLATED A1C: CPT | Performed by: NURSE PRACTITIONER

## 2024-06-21 PROCEDURE — 82306 VITAMIN D 25 HYDROXY: CPT | Performed by: NURSE PRACTITIONER

## 2024-08-12 ENCOUNTER — HOSPITAL ENCOUNTER (OUTPATIENT)
Dept: GENERAL RADIOLOGY | Facility: HOSPITAL | Age: 54
Discharge: HOME | End: 2024-08-12
Admitting: NURSE PRACTITIONER
Payer: COMMERCIAL

## 2024-08-12 DIAGNOSIS — M54.50 ACUTE BILATERAL LOW BACK PAIN WITHOUT SCIATICA: ICD-10-CM

## 2024-08-12 DIAGNOSIS — M54.50 ACUTE BILATERAL LOW BACK PAIN WITHOUT SCIATICA: Primary | ICD-10-CM

## 2024-08-12 PROCEDURE — 72110 X-RAY EXAM L-2 SPINE 4/>VWS: CPT

## 2024-08-15 ENCOUNTER — OFFICE VISIT (OUTPATIENT)
Dept: FAMILY MEDICINE CLINIC | Facility: CLINIC | Age: 54
End: 2024-08-15
Payer: COMMERCIAL

## 2024-08-15 VITALS
OXYGEN SATURATION: 95 % | WEIGHT: 172 LBS | HEART RATE: 78 BPM | DIASTOLIC BLOOD PRESSURE: 72 MMHG | HEIGHT: 66 IN | BODY MASS INDEX: 27.64 KG/M2 | SYSTOLIC BLOOD PRESSURE: 124 MMHG

## 2024-08-15 DIAGNOSIS — G89.29 CHRONIC LEFT-SIDED LOW BACK PAIN WITHOUT SCIATICA: Primary | ICD-10-CM

## 2024-08-15 DIAGNOSIS — E66.3 OVERWEIGHT (BMI 25.0-29.9): ICD-10-CM

## 2024-08-15 DIAGNOSIS — M54.50 CHRONIC LEFT-SIDED LOW BACK PAIN WITHOUT SCIATICA: Primary | ICD-10-CM

## 2024-08-15 PROCEDURE — 99214 OFFICE O/P EST MOD 30 MIN: CPT | Performed by: NURSE PRACTITIONER

## 2024-08-15 RX ORDER — MELOXICAM 7.5 MG/1
7.5 TABLET ORAL DAILY
Qty: 30 TABLET | Refills: 1 | Status: SHIPPED | OUTPATIENT
Start: 2024-08-15

## 2024-08-15 NOTE — PROGRESS NOTES
"Chief Complaint  Follow-up (3 month follow up overweight ) and Back Pain (Lower back pain)    Subjective        Eveline Hayward presents to [facility]  History of Present Illness    Patient presents for f/u visit.     Overweight, current BMI is 27.76. Goal weight is 160 pounds. Patient has completed 1.5 month regimen of Phentermine, 10 pound weight loss noted. She is working on dietary improvements and regular exercise as well.     Patient is c/o low back pain, chronic but progressing. X-ray showed moderate to severe disc space narrowing at L5-S1. Lower lumbar spine facet arthropathy. There is occasional radiation of pain into left lower extremity.     Objective   Vital Signs:  /72 (BP Location: Left arm, Patient Position: Sitting, Cuff Size: Adult)   Pulse 78   Ht 167.6 cm (66\")   Wt 78 kg (172 lb)   SpO2 95%   BMI 27.76 kg/m²   Estimated body mass index is 27.76 kg/m² as calculated from the following:    Height as of this encounter: 167.6 cm (66\").    Weight as of this encounter: 78 kg (172 lb).            Physical Exam  Constitutional:       Appearance: Normal appearance.   HENT:      Head: Normocephalic.   Cardiovascular:      Rate and Rhythm: Normal rate and regular rhythm.   Pulmonary:      Effort: Pulmonary effort is normal.      Breath sounds: Normal breath sounds.   Abdominal:      General: Abdomen is flat. Bowel sounds are normal.      Palpations: Abdomen is soft.   Musculoskeletal:         General: Normal range of motion.      Cervical back: Neck supple.      Right lower leg: No edema.      Left lower leg: No edema.   Skin:     General: Skin is warm and dry.   Neurological:      Mental Status: She is alert and oriented to person, place, and time.      Gait: Gait is intact.   Psychiatric:         Attention and Perception: Attention normal.         Mood and Affect: Mood normal.         Speech: Speech normal.        Result Review :    [facility]          6/21/2024    " 07:59   CMP   Glucose 95    BUN 14    Creatinine 0.88    EGFR 78.2    Sodium 140    Potassium 4.1    Chloride 103    Calcium 9.4    Total Protein 7.5    Albumin 4.3    Globulin 3.2    Total Bilirubin 0.4    Alkaline Phosphatase 90    AST (SGOT) 21    ALT (SGPT) 23    Albumin/Globulin Ratio 1.3    BUN/Creatinine Ratio 15.9    Anion Gap 8.0      CBC          6/21/2024    07:59   CBC   WBC 5.48    RBC 4.94    Hemoglobin 14.9    Hematocrit 44.9    MCV 90.9    MCH 30.2    MCHC 33.2    RDW 12.7    Platelets 252      Lipid Panel          6/21/2024    07:59   Lipid Panel   Total Cholesterol 253    Triglycerides 51    HDL Cholesterol 85    VLDL Cholesterol 8    LDL Cholesterol  160    LDL/HDL Ratio 1.86      TSH          6/21/2024    07:59   TSH   TSH 2.710      Most Recent A1C          6/21/2024    07:59   HGBA1C Most Recent   Hemoglobin A1C 5.40                Assessment and Plan   Diagnoses and all orders for this visit:    1. Chronic left-sided low back pain without sciatica (Primary)  Assessment & Plan:  X-ray reviewed  Refer for PT  Start Mobic 7.5 mg daily  Follow up in 3 months  Consider MRI vs. Ortho if no relief    Orders:  -     Ambulatory Referral to Physical Therapy for Evaluation & Treatment    2. Overweight (BMI 25.0-29.9)  Assessment & Plan:  Patient's (Body mass index is 27.76 kg/m².) indicates that they are overweight with health conditions that include osteoarthritis . Weight is improving with treatment. BMI is above average; BMI management plan is completed. We discussed portion control, increasing exercise, and pharmacologic options including finish Phentermine regimen .       Other orders  -     meloxicam (Mobic) 7.5 MG tablet; Take 1 tablet by mouth Daily.  Dispense: 30 tablet; Refill: 1           I spent 30 minutes caring for Eveline on this date of service. This time includes time spent by me in the following activities:preparing for the visit, reviewing tests, obtaining and/or reviewing a  separately obtained history, performing a medically appropriate examination and/or evaluation , counseling and educating the patient/family/caregiver, ordering medications, tests, or procedures, documenting information in the medical record, and care coordination  Follow Up   Return in about 3 months (around 11/15/2024) for Weight Check/Back Pain.  Patient was given instructions and counseling regarding her condition or for health maintenance advice. Please see specific information pulled into the AVS if appropriate.

## 2024-08-16 NOTE — ASSESSMENT & PLAN NOTE
X-ray reviewed  Refer for PT  Start Mobic 7.5 mg daily  Follow up in 3 months  Consider MRI vs. Ortho if no relief

## 2024-08-16 NOTE — ASSESSMENT & PLAN NOTE
Patient's (Body mass index is 27.76 kg/m².) indicates that they are overweight with health conditions that include osteoarthritis . Weight is improving with treatment. BMI is above average; BMI management plan is completed. We discussed portion control, increasing exercise, and pharmacologic options including finish Phentermine regimen .

## 2024-08-22 DIAGNOSIS — E66.3 OVERWEIGHT (BMI 25.0-29.9): Primary | ICD-10-CM

## 2024-08-22 RX ORDER — PHENTERMINE HYDROCHLORIDE 37.5 MG/1
37.5 CAPSULE ORAL EVERY MORNING
Qty: 30 CAPSULE | Refills: 1 | Status: SHIPPED | OUTPATIENT
Start: 2024-08-22

## 2024-10-18 ENCOUNTER — TELEPHONE (OUTPATIENT)
Dept: FAMILY MEDICINE CLINIC | Facility: CLINIC | Age: 54
End: 2024-10-18
Payer: COMMERCIAL

## 2024-10-18 NOTE — TELEPHONE ENCOUNTER
Called pt. To reschedule 11/24 appt. She stated had to call us back after she gets her new schedule for 2025

## 2025-05-08 RX ORDER — HYDROCORTISONE 25 MG/G
1 CREAM TOPICAL 2 TIMES DAILY
Qty: 60 G | Refills: 0 | Status: SHIPPED | OUTPATIENT
Start: 2025-05-08

## 2025-05-08 RX ORDER — CLINDAMYCIN PHOSPHATE 10 UG/ML
LOTION TOPICAL 2 TIMES DAILY
Qty: 100 ML | Refills: 0 | Status: SHIPPED | OUTPATIENT
Start: 2025-05-08

## (undated) DEVICE — CANN NASL CO2 TRULINK W/O2 A/

## (undated) DEVICE — TBG 02 CRUSH RESIST LF CLR 7FT

## (undated) DEVICE — TUBING, SUCTION, 1/4" X 10', STRAIGHT: Brand: MEDLINE

## (undated) DEVICE — THE TORRENT IRRIGATION SCOPE CONNECTOR IS USED WITH THE TORRENT IRRIGATION TUBING TO PROVIDE IRRIGATION FLUIDS SUCH AS STERILE WATER DURING GASTROINTESTINAL ENDOSCOPIC PROCEDURES WHEN USED IN CONJUNCTION WITH AN IRRIGATION PUMP (OR ELECTROSURGICAL UNIT).: Brand: TORRENT

## (undated) DEVICE — Device: Brand: DEFENDO AIR/WATER/SUCTION AND BIOPSY VALVE